# Patient Record
Sex: MALE | Race: WHITE | NOT HISPANIC OR LATINO | URBAN - METROPOLITAN AREA
[De-identification: names, ages, dates, MRNs, and addresses within clinical notes are randomized per-mention and may not be internally consistent; named-entity substitution may affect disease eponyms.]

---

## 2017-02-04 ENCOUNTER — INPATIENT (INPATIENT)
Facility: HOSPITAL | Age: 57
LOS: 2 days | Discharge: HOME | DRG: 602 | End: 2017-02-07
Attending: STUDENT IN AN ORGANIZED HEALTH CARE EDUCATION/TRAINING PROGRAM | Admitting: STUDENT IN AN ORGANIZED HEALTH CARE EDUCATION/TRAINING PROGRAM
Payer: COMMERCIAL

## 2017-02-04 VITALS
RESPIRATION RATE: 18 BRPM | HEART RATE: 118 BPM | DIASTOLIC BLOOD PRESSURE: 92 MMHG | TEMPERATURE: 98 F | WEIGHT: 283.96 LBS | HEIGHT: 72 IN | OXYGEN SATURATION: 91 % | SYSTOLIC BLOOD PRESSURE: 146 MMHG

## 2017-02-04 LAB
APPEARANCE UR: CLEAR — SIGNIFICANT CHANGE UP
APTT BLD: 30.1 SEC — SIGNIFICANT CHANGE UP (ref 27.5–37.4)
BASE EXCESS BLDCOV CALC-SCNC: 5.3 MMOL/L — HIGH (ref -9.3–0.3)
BASOPHILS NFR BLD AUTO: 0.4 % — SIGNIFICANT CHANGE UP (ref 0–2)
BILIRUB UR-MCNC: NEGATIVE — SIGNIFICANT CHANGE UP
CK MB BLD-MCNC: 3.23 % — SIGNIFICANT CHANGE UP
CK MB CFR SERPL CALC: 9.3 NG/ML — HIGH (ref 0.5–3.6)
CK SERPL-CCNC: 288 U/L — SIGNIFICANT CHANGE UP (ref 39–308)
COLOR SPEC: YELLOW — SIGNIFICANT CHANGE UP
DIFF PNL FLD: (no result)
EOSINOPHIL NFR BLD AUTO: 1.6 % — SIGNIFICANT CHANGE UP (ref 0–6)
GAS PNL BLDCOV: 7.34 — SIGNIFICANT CHANGE UP (ref 7.25–7.45)
GAS PNL BLDCOV: SIGNIFICANT CHANGE UP
GLUCOSE UR QL: NEGATIVE — SIGNIFICANT CHANGE UP
HCO3 BLDCOV-SCNC: 33.4 MMOL/L — SIGNIFICANT CHANGE UP
HCT VFR BLD CALC: 45.6 % — SIGNIFICANT CHANGE UP (ref 39–50)
HGB BLD-MCNC: 14.8 G/DL — SIGNIFICANT CHANGE UP (ref 13–17)
INR BLD: 1.15 — SIGNIFICANT CHANGE UP (ref 0.88–1.16)
KETONES UR-MCNC: NEGATIVE — SIGNIFICANT CHANGE UP
LACTATE SERPL-SCNC: 1.4 MMOL/L — SIGNIFICANT CHANGE UP (ref 0.5–2)
LEUKOCYTE ESTERASE UR-ACNC: NEGATIVE — SIGNIFICANT CHANGE UP
LYMPHOCYTES # BLD AUTO: 21.9 % — SIGNIFICANT CHANGE UP (ref 13–44)
MCHC RBC-ENTMCNC: 30.1 PG — SIGNIFICANT CHANGE UP (ref 27–34)
MCHC RBC-ENTMCNC: 32.5 G/DL — SIGNIFICANT CHANGE UP (ref 32–36)
MCV RBC AUTO: 92.9 FL — SIGNIFICANT CHANGE UP (ref 80–100)
MONOCYTES NFR BLD AUTO: 7.7 % — SIGNIFICANT CHANGE UP (ref 2–14)
NEUTROPHILS NFR BLD AUTO: 68.4 % — SIGNIFICANT CHANGE UP (ref 43–77)
NITRITE UR-MCNC: NEGATIVE — SIGNIFICANT CHANGE UP
PCO2 BLDCOV: 64 MMHG — HIGH (ref 27–49)
PH UR: 6 — SIGNIFICANT CHANGE UP (ref 4–8)
PLATELET # BLD AUTO: 230 K/UL — SIGNIFICANT CHANGE UP (ref 150–400)
PO2 BLDCOA: 50 MMHG — HIGH (ref 17–41)
PROT UR-MCNC: NEGATIVE MG/DL — SIGNIFICANT CHANGE UP
PROTHROM AB SERPL-ACNC: 12.8 SEC — SIGNIFICANT CHANGE UP (ref 10–13.1)
RBC # BLD: 4.91 M/UL — SIGNIFICANT CHANGE UP (ref 4.2–5.8)
RBC # FLD: 13.7 % — SIGNIFICANT CHANGE UP (ref 10.3–16.9)
SAO2 % BLDCOV: SIGNIFICANT CHANGE UP
SP GR SPEC: 1.01 — SIGNIFICANT CHANGE UP (ref 1–1.03)
TROPONIN I SERPL-MCNC: <0.015 NG/ML — SIGNIFICANT CHANGE UP (ref 0.01–0.04)
UROBILINOGEN FLD QL: 0.2 E.U./DL — SIGNIFICANT CHANGE UP
WBC # BLD: 6.9 K/UL — SIGNIFICANT CHANGE UP (ref 3.8–10.5)
WBC # FLD AUTO: 6.9 K/UL — SIGNIFICANT CHANGE UP (ref 3.8–10.5)

## 2017-02-04 PROCEDURE — 93010 ELECTROCARDIOGRAM REPORT: CPT

## 2017-02-04 PROCEDURE — 71020: CPT | Mod: 26

## 2017-02-04 PROCEDURE — 99285 EMERGENCY DEPT VISIT HI MDM: CPT | Mod: 25

## 2017-02-04 RX ORDER — RIVAROXABAN 15 MG-20MG
20 KIT ORAL ONCE
Qty: 0 | Refills: 0 | Status: COMPLETED | OUTPATIENT
Start: 2017-02-04 | End: 2017-02-04

## 2017-02-04 RX ORDER — METOPROLOL TARTRATE 50 MG
50 TABLET ORAL DAILY
Qty: 0 | Refills: 0 | Status: DISCONTINUED | OUTPATIENT
Start: 2017-02-04 | End: 2017-02-05

## 2017-02-04 RX ORDER — IPRATROPIUM/ALBUTEROL SULFATE 18-103MCG
3 AEROSOL WITH ADAPTER (GRAM) INHALATION ONCE
Qty: 0 | Refills: 0 | Status: COMPLETED | OUTPATIENT
Start: 2017-02-04 | End: 2017-02-04

## 2017-02-04 RX ORDER — VANCOMYCIN HCL 1 G
1000 VIAL (EA) INTRAVENOUS ONCE
Qty: 0 | Refills: 0 | Status: COMPLETED | OUTPATIENT
Start: 2017-02-04 | End: 2017-02-04

## 2017-02-04 RX ORDER — METOPROLOL TARTRATE 50 MG
100 TABLET ORAL ONCE
Qty: 0 | Refills: 0 | Status: COMPLETED | OUTPATIENT
Start: 2017-02-04 | End: 2017-02-04

## 2017-02-04 RX ORDER — FUROSEMIDE 40 MG
40 TABLET ORAL ONCE
Qty: 0 | Refills: 0 | Status: COMPLETED | OUTPATIENT
Start: 2017-02-04 | End: 2017-02-04

## 2017-02-04 RX ORDER — METOPROLOL TARTRATE 50 MG
100 TABLET ORAL DAILY
Qty: 0 | Refills: 0 | Status: DISCONTINUED | OUTPATIENT
Start: 2017-02-04 | End: 2017-02-04

## 2017-02-04 RX ADMIN — Medication 250 MILLIGRAM(S): at 22:57

## 2017-02-04 RX ADMIN — Medication 40 MILLIGRAM(S): at 21:30

## 2017-02-04 RX ADMIN — RIVAROXABAN 20 MILLIGRAM(S): KIT at 23:36

## 2017-02-04 RX ADMIN — Medication 100 MILLIGRAM(S): at 23:36

## 2017-02-04 RX ADMIN — Medication 3 MILLILITER(S): at 21:30

## 2017-02-04 RX ADMIN — Medication 50 MILLIGRAM(S): at 23:36

## 2017-02-04 RX ADMIN — Medication 3 MILLILITER(S): at 22:32

## 2017-02-04 NOTE — ED PROVIDER NOTE - PROGRESS NOTE DETAILS
Case discussed with , plan: put back on his original medications, continue monitor while in the ER , admit to medicine if needed  for cellulitis and /or social service placement.

## 2017-02-04 NOTE — ED PROVIDER NOTE - MEDICAL DECISION MAKING DETAILS
58 yo male with h/o COPD, Afib, HTN, chronic LE edema, CHF, in the ER with worsening of his leg edema and pain as well as SOB . pt is non-compliant with his medications. CXR,  labs done, pt started on nebulizer treatment and all his medications for  BP and rate control, broad spectrum abx given for labs cellulitis. will admit for further management.

## 2017-02-04 NOTE — ED ADULT NURSE NOTE - OBJECTIVE STATEMENT
58 y/o male w/ a fib (on xarelto, non compliant), COPD, 58 y/o male w/ a fib (on xarelto, non compliant), COPD, HTN, HLD c/o SOB and LE swelling x4 weeks. Patient tachypneic and tachycardiac in a fib on arrival. Patient states "[place]." Patient has wet cough w/ clear mucous production. +2 pitting edema to lower extremities. Patient A+Ox3, skin intact, patient able to ambulate steady with assistance.

## 2017-02-04 NOTE — ED PROVIDER NOTE - MUSCULOSKELETAL, MLM
Spine appears normal, range of motion is not limited, no muscle or joint tenderness, b/l LE pitting edema++,

## 2017-02-04 NOTE — ED PROVIDER NOTE - SKIN, MLM
chronic appearing, erythematous, edematous, with dry scaly skin lower extremities+, increased warmth to touch+,

## 2017-02-04 NOTE — ED PROVIDER NOTE - OBJECTIVE STATEMENT
56 yo male with h/o HTN, Afib, COPD, smoker, obese, homeless, appears  disheveled and  SOB. Pt states that he lost his medications? on the train few days ago? and his legs became more swollen and his breathing is much worse. Pt denies fever or chills, denies CP.

## 2017-02-05 DIAGNOSIS — R06.02 SHORTNESS OF BREATH: ICD-10-CM

## 2017-02-05 DIAGNOSIS — J44.9 CHRONIC OBSTRUCTIVE PULMONARY DISEASE, UNSPECIFIED: ICD-10-CM

## 2017-02-05 DIAGNOSIS — I82.409 ACUTE EMBOLISM AND THROMBOSIS OF UNSPECIFIED DEEP VEINS OF UNSPECIFIED LOWER EXTREMITY: ICD-10-CM

## 2017-02-05 DIAGNOSIS — L03.119 CELLULITIS OF UNSPECIFIED PART OF LIMB: ICD-10-CM

## 2017-02-05 DIAGNOSIS — I48.91 UNSPECIFIED ATRIAL FIBRILLATION: ICD-10-CM

## 2017-02-05 DIAGNOSIS — I50.9 HEART FAILURE, UNSPECIFIED: ICD-10-CM

## 2017-02-05 DIAGNOSIS — I10 ESSENTIAL (PRIMARY) HYPERTENSION: ICD-10-CM

## 2017-02-05 LAB
ANION GAP SERPL CALC-SCNC: 9 MMOL/L — SIGNIFICANT CHANGE UP (ref 9–16)
BUN SERPL-MCNC: 14 MG/DL — SIGNIFICANT CHANGE UP (ref 7–23)
CALCIUM SERPL-MCNC: 9.2 MG/DL — SIGNIFICANT CHANGE UP (ref 8.5–10.5)
CHLORIDE SERPL-SCNC: 100 MMOL/L — SIGNIFICANT CHANGE UP (ref 96–108)
CO2 SERPL-SCNC: 34 MMOL/L — HIGH (ref 22–31)
CREAT SERPL-MCNC: 0.93 MG/DL — SIGNIFICANT CHANGE UP (ref 0.5–1.3)
GLUCOSE SERPL-MCNC: 164 MG/DL — HIGH (ref 70–99)
HCT VFR BLD CALC: 49.7 % — SIGNIFICANT CHANGE UP (ref 39–50)
HGB BLD-MCNC: 16.3 G/DL — SIGNIFICANT CHANGE UP (ref 13–17)
MAGNESIUM SERPL-MCNC: 2.1 MG/DL — SIGNIFICANT CHANGE UP (ref 1.6–2.4)
MCHC RBC-ENTMCNC: 31.1 PG — SIGNIFICANT CHANGE UP (ref 27–34)
MCHC RBC-ENTMCNC: 32.8 G/DL — SIGNIFICANT CHANGE UP (ref 32–36)
MCV RBC AUTO: 94.8 FL — SIGNIFICANT CHANGE UP (ref 80–100)
PLATELET # BLD AUTO: 247 K/UL — SIGNIFICANT CHANGE UP (ref 150–400)
POTASSIUM SERPL-MCNC: 4.5 MMOL/L — SIGNIFICANT CHANGE UP (ref 3.5–5.3)
POTASSIUM SERPL-SCNC: 4.5 MMOL/L — SIGNIFICANT CHANGE UP (ref 3.5–5.3)
RBC # BLD: 5.24 M/UL — SIGNIFICANT CHANGE UP (ref 4.2–5.8)
RBC # FLD: 13.8 % — SIGNIFICANT CHANGE UP (ref 10.3–16.9)
SODIUM SERPL-SCNC: 143 MMOL/L — SIGNIFICANT CHANGE UP (ref 135–145)
WBC # BLD: 10 K/UL — SIGNIFICANT CHANGE UP (ref 3.8–10.5)
WBC # FLD AUTO: 10 K/UL — SIGNIFICANT CHANGE UP (ref 3.8–10.5)

## 2017-02-05 PROCEDURE — 93970 EXTREMITY STUDY: CPT | Mod: 26

## 2017-02-05 PROCEDURE — 93010 ELECTROCARDIOGRAM REPORT: CPT

## 2017-02-05 PROCEDURE — 99223 1ST HOSP IP/OBS HIGH 75: CPT

## 2017-02-05 RX ORDER — IPRATROPIUM/ALBUTEROL SULFATE 18-103MCG
3 AEROSOL WITH ADAPTER (GRAM) INHALATION EVERY 4 HOURS
Qty: 0 | Refills: 0 | Status: DISCONTINUED | OUTPATIENT
Start: 2017-02-05 | End: 2017-02-05

## 2017-02-05 RX ORDER — NICOTINE POLACRILEX 2 MG
1 GUM BUCCAL DAILY
Qty: 0 | Refills: 0 | Status: DISCONTINUED | OUTPATIENT
Start: 2017-02-05 | End: 2017-02-07

## 2017-02-05 RX ORDER — RIVAROXABAN 15 MG-20MG
20 KIT ORAL EVERY 24 HOURS
Qty: 0 | Refills: 0 | Status: DISCONTINUED | OUTPATIENT
Start: 2017-02-05 | End: 2017-02-07

## 2017-02-05 RX ORDER — DIGOXIN 250 MCG
0.12 TABLET ORAL DAILY
Qty: 0 | Refills: 0 | Status: DISCONTINUED | OUTPATIENT
Start: 2017-02-05 | End: 2017-02-07

## 2017-02-05 RX ORDER — VANCOMYCIN HCL 1 G
1500 VIAL (EA) INTRAVENOUS EVERY 12 HOURS
Qty: 0 | Refills: 0 | Status: DISCONTINUED | OUTPATIENT
Start: 2017-02-05 | End: 2017-02-06

## 2017-02-05 RX ORDER — METOPROLOL TARTRATE 50 MG
100 TABLET ORAL DAILY
Qty: 0 | Refills: 0 | Status: DISCONTINUED | OUTPATIENT
Start: 2017-02-05 | End: 2017-02-07

## 2017-02-05 RX ORDER — LEVALBUTEROL 1.25 MG/.5ML
0.63 SOLUTION, CONCENTRATE RESPIRATORY (INHALATION) EVERY 4 HOURS
Qty: 0 | Refills: 0 | Status: DISCONTINUED | OUTPATIENT
Start: 2017-02-05 | End: 2017-02-07

## 2017-02-05 RX ORDER — ACETAMINOPHEN 500 MG
650 TABLET ORAL EVERY 6 HOURS
Qty: 0 | Refills: 0 | Status: DISCONTINUED | OUTPATIENT
Start: 2017-02-05 | End: 2017-02-07

## 2017-02-05 RX ORDER — ATORVASTATIN CALCIUM 80 MG/1
40 TABLET, FILM COATED ORAL AT BEDTIME
Qty: 0 | Refills: 0 | Status: DISCONTINUED | OUTPATIENT
Start: 2017-02-05 | End: 2017-02-07

## 2017-02-05 RX ORDER — FLUTICASONE PROPIONATE AND SALMETEROL 50; 250 UG/1; UG/1
1 POWDER ORAL; RESPIRATORY (INHALATION)
Qty: 0 | Refills: 0 | Status: DISCONTINUED | OUTPATIENT
Start: 2017-02-05 | End: 2017-02-07

## 2017-02-05 RX ORDER — LEVALBUTEROL 1.25 MG/.5ML
0.31 SOLUTION, CONCENTRATE RESPIRATORY (INHALATION) EVERY 4 HOURS
Qty: 0 | Refills: 0 | Status: DISCONTINUED | OUTPATIENT
Start: 2017-02-05 | End: 2017-02-05

## 2017-02-05 RX ORDER — FUROSEMIDE 40 MG
40 TABLET ORAL
Qty: 0 | Refills: 0 | Status: DISCONTINUED | OUTPATIENT
Start: 2017-02-05 | End: 2017-02-07

## 2017-02-05 RX ADMIN — Medication 3 MILLILITER(S): at 02:08

## 2017-02-05 RX ADMIN — Medication 60 MILLIGRAM(S): at 21:36

## 2017-02-05 RX ADMIN — Medication 60 MILLIGRAM(S): at 14:00

## 2017-02-05 RX ADMIN — Medication 0.12 MILLIGRAM(S): at 10:42

## 2017-02-05 RX ADMIN — Medication 300 MILLIGRAM(S): at 21:36

## 2017-02-05 RX ADMIN — RIVAROXABAN 20 MILLIGRAM(S): KIT at 17:49

## 2017-02-05 RX ADMIN — FLUTICASONE PROPIONATE AND SALMETEROL 1 DOSE(S): 50; 250 POWDER ORAL; RESPIRATORY (INHALATION) at 21:36

## 2017-02-05 RX ADMIN — Medication 40 MILLIGRAM(S): at 10:42

## 2017-02-05 RX ADMIN — Medication 1 PATCH: at 12:38

## 2017-02-05 RX ADMIN — Medication 60 MILLIGRAM(S): at 06:24

## 2017-02-05 RX ADMIN — FLUTICASONE PROPIONATE AND SALMETEROL 1 DOSE(S): 50; 250 POWDER ORAL; RESPIRATORY (INHALATION) at 10:41

## 2017-02-05 RX ADMIN — Medication 300 MILLIGRAM(S): at 10:41

## 2017-02-05 RX ADMIN — Medication 100 MILLIGRAM(S): at 19:40

## 2017-02-05 RX ADMIN — ATORVASTATIN CALCIUM 40 MILLIGRAM(S): 80 TABLET, FILM COATED ORAL at 21:36

## 2017-02-05 RX ADMIN — Medication 40 MILLIGRAM(S): at 21:36

## 2017-02-05 NOTE — H&P ADULT. - PROBLEM SELECTOR PLAN 6
BP improved to SBP 140s from HTN in ED. Acceptable for now.  - c.w tx as above    PPx. Xarelto    FEN. No IVF for now. Replete lytes prn K>4, Mg>2. NPO on bipap    Dispo. Pending clincial improvement. BP improved to SBP 140s from HTN in ED. Acceptable for now.  - c/w tx as above    PPx. Xarelto    FEN. No IVF for now. Replete lytes prn K>4, Mg>2. NPO on bipap    Dispo. Pending clinical improvement.

## 2017-02-05 NOTE — H&P ADULT. - PROBLEM SELECTOR PLAN 3
Last EF: 50% 2/2016. Not floridly overloaded on exam.  - echo in AM  - c/w lasix 40 bid  - toprol XL 50 qd  - strict IOs Last EF: 50% 2/2016. Not floridly overloaded on exam. s/p lasix 40 iv x 1 in ED.  - echo in AM  - c/w lasix 40 bid  - toprol  qd  - strict IOs Last EF: 50% 2/2016. Not overloaded on exam. s/p lasix 40 iv x 1 in ED.  - echo in AM  - c/w lasix 40 bid  - toprol  qd  - strict IOs

## 2017-02-05 NOTE — H&P ADULT. - PROBLEM SELECTOR PLAN 4
No wheezing on exam, good air movement BL. Elevated bicarb chronic, likely 2/2 chronic CO2 retention. Current smoker. Last DC paperwork suggests should be on home O2 and bipap.  - no steroids for now, does not appear to be in exacerbation  - kianna standing overnight  - c/w fluticasone/salmeterol No wheezing on exam, good air movement BL. Elevated bicarb chronic, likely 2/2 chronic CO2 retention. Current smoker. Last DC paperwork suggests should be on home O2 and bipap.  - no steroids for now, does not appear to be in exacerbation  - kianna standing overnight  - c/w fluticasone/salmeterol bid No wheezing on exam, good air movement BL though decreased sounds at BL bases. Elevated bicarb chronic, likely 2/2 chronic CO2 retention. Current smoker. Last DC paperwork suggests should be on home O2 and bipap.  - will start solumedrol 60 q8h  - duonebs standing overnight  - c/w fluticasone/salmeterol bid

## 2017-02-05 NOTE — H&P ADULT. - HISTORY OF PRESENT ILLNESS
56 yo undomeciled per report M PMH current smoker, COPD (last [facility] DC 2/2016 on home O2 and bipap), HTN, HLD, Afib (on xeralto), dCHF (EF: 50% 2/2016), 2/2016 H DC after dCHF exacerbation and rapid AFib presents with SOB. Pt poor historian and minimal participation in exam; reports lost all his medications about 1 week ago and subsequently began experiencing BL leg swelling and SOB.    In ED: 97.8, 101-120, 146/92 - 160/100, 18-22, 95% bipap. UA: no infection. CXR: clear lungs, no overt focal opacification. Received: xarelto 20, vanc 1g IV, diltiazem 20, lasix 40, toprol , duonebs. Cardio service rejected pt in ED, admit to medicine. 56 yo M PMH current smoker (50 pack years), COPD (last [facility] DC 2/2016 on home O2 and bipap), HTN, HLD, Afib (on xeralto), dCHF (EF: 50% 2/2016), 2/2016 [facility] DC after dCHF exacerbation and rapid AFib presents with BL LE swelling and SOB. Pt poor historian. Reports lost all his medications about 1 week ago and subsequently began experiencing BL leg swelling and SOB. Also experiencing L shin pain x 2 weeks. No recent trauma to area. Denies new HA/chest pain/abd pain/N/V/diarrhea/dysuria/fever/chills. Lives in [facility] in a shared living situation. Reports no PMD or recent MD visits. +unchanged chronic cough, occasional yellow sputum usually dry cough.    In ED: 97.8, 101-120, 146/92 - 160/100, 18-22, 95% bipap. UA: no infection. CXR: clear lungs, no overt focal opacification. Received: xarelto 20, vanc 1g IV, diltiazem 20, lasix 40, toprol , duonebs. Cardio service rejected pt in ED, admit to medicine. 56 yo M PMH current smoker (50 pack years), COPD (last [facility] DC 2/2016 on home O2 and bipap), HTN, HLD, Afib (on xeralto), dCHF (EF: 50% 2/2016), 2/2016 [facility] DC after dCHF exacerbation and rapid AFib presents with BL LE swelling and SOB. Pt poor historian. Reports lost all his medications about 1 week ago and subsequently began experiencing BL leg swelling and SOB. Also experiencing L shin pain x 2 weeks. No recent trauma to area. Denies new HA/chest pain/abd pain/N/V/diarrhea/dysuria/fever/chills. Lives in [facility] in a shared living situation. Reports no PMD or recent MD visits. +unchanged chronic cough, occasional yellow sputum usually dry cough.    In ED: 97.8, 101-120, 146/92 - 160/100, 18-22, 95% bipap. ECG: AF, no acute STT changes. Trops wnl. UA: no infection. CXR: clear lungs, no overt focal opacification. Received: xarelto 20, vanc 1g IV, diltiazem 20, lasix 40, toprol , duonebs. Cardio service rejected pt in ED, admit to medicine. 58 yo M PMH current smoker (50 pack years), COPD (last [facility] DC 2/2016 on home O2 and bipap), HTN, HLD, Afib (on xeralto), dCHF (EF: 50% 2/2016), 2/2016 [facility] DC after dCHF exacerbation and rapid AFib presents with BL LE swelling, SOB, and L>R leg pain. Pt poor historian. Reports lost all his medications about 1 week ago and subsequently began experiencing BL leg swelling and SOB. Also experiencing L shin area pain x 2 weeks. No recent trauma. Denies new HA/chest pain/abd pain/N/V/diarrhea/dysuria/fever/chills. Lives in [facility] in a shared living situation. Reports no PMD or recent MD visits. +unchanged chronic cough, occasional yellow sputum though usually dry cough.    In ED: 97.8, 101-120, 146/92 - 160/100, 18-22, 95% on bipap. ECG: AF, no acute STT changes. Trops wnl. UA: no infection. CXR: clear lungs, no overt focal opacification. Received: xarelto 20, vanc 1g IV, diltiazem 20, lasix 40 iv x1, toprol , duonebs. Cardio service rejected pt in ED, admit to medicine. 58 yo M PMH current smoker (50 pack years), COPD (last [facility] DC 2/2016 on home O2 and bipap), HTN, HLD, Afib (on xeralto), CHF (EF: 50% 2/2016), 2/2016 [facility] DC after CHF exacerbation and rapid AFib presents with BL LE swelling, SOB, and L>R leg pain. Pt poor historian. Reports lost all his medications about 1 week ago and subsequently began experiencing BL leg swelling and SOB. Also experiencing L shin area pain x 2 weeks. No recent trauma. Denies new HA/chest pain/abd pain/N/V/diarrhea/dysuria/fever/chills. Lives in [facility] in a shared living situation. Reports no PMD or recent MD visits. +unchanged chronic cough, occasional yellow sputum though usually dry cough.    In ED: 97.8, 101-120, 146/92 - 160/100, 18-22, 95% on bipap. ECG: AF, no acute STT changes. Trops wnl. UA: no infection. CXR: clear lungs, no overt focal opacification. Received: xarelto 20, vanc 1g IV, diltiazem 20, lasix 40 iv x1, toprol , duonebs. Cardio service rejected pt in ED, admit to medicine.

## 2017-02-05 NOTE — H&P ADULT. - SUBSTANCE USE COMMENT, PROFILE
current smoker, many years current smoker, >40 pack years, drinks etoh socially, not heavily, no past withdrawal, no illicit drug use

## 2017-02-05 NOTE — H&P ADULT. - ASSESSMENT
56 yo undomeciled per report M PMH current smoker, COPD (last [facility] DC 2/2016 on home O2 and bipap), HTN, HLD, Afib (on xeralto), dCHF (EF: 50% 2/2016), 2/2016 [facility] DC after dCHF exacerbation and rapid AFib presents with leg swelling and SOB x 1 week. Pt poor historian and minimal participation in exam; reports lost all his medications about 1 week ago and subsequently began experiencing BL leg swelling and SOB. Does not meet SIRS sepsis criteria. HTN and rapid Afib improved after tx in ED, currently on bipap. CXR: clear lungs, no overt focal opacification. Cardio service rejected pt in ED, admit to medicine for evaluation of cellulitis. 58 yo undomeciled per report M PMH current smoker, COPD (last [facility] DC 2/2016 on home O2 and bipap), HTN, HLD, Afib (on xeralto), CHF (EF: 50% 2/2016), 2/2016 [facility] DC after CHF exacerbation and rapid AFib presents with leg swelling and SOB x 1 week. Pt poor historian and minimal participation in exam; reports lost all his medications about 1 week ago and subsequently began experiencing BL leg swelling and SOB. Does not meet SIRS sepsis criteria. HTN and rapid Afib improved after tx in ED, currently on bipap. CXR: clear lungs, no overt focal opacification. Cardio service rejected pt in ED, admit to medicine for evaluation of cellulitis.

## 2017-02-05 NOTE — H&P ADULT. - PROBLEM SELECTOR PLAN 1
Likely 2/2 rapid AF, obesity, needs home O2/bipap per last DC paperwork but unlikely using it. Not floridly overloaded on exam or CXR, BNP 900s, low suspicion CHF exacerbation. No wheezing, good air movement BL - low suspicion COPD exacerbation. Not meetings sepsis criteria 2/2 cellulitis - sepsis unlikely driving SOB, non toxic on exam.  - c/w bipap overnight  - rate control Afib as below Likely 2/2 rapid AF, obesity, needs home O2/bipap per last DC paperwork but unlikely using it. Not floridly overloaded on exam or CXR, BNP 900s, low suspicion CHF exacerbation. No wheezing, good air movement BL though decreased sounds at bases - suspicion for COPD exacerbation that may be contributing to SOB though not extremely tight. Not meetings sepsis criteria 2/2 cellulitis - sepsis unlikely driving SOB, non toxic on exam.  - c/w bipap overnight  - rate control Afib as below

## 2017-02-05 NOTE — H&P ADULT. - PROBLEM SELECTOR PLAN 2
Not meeting sepsis criteria. LLE cellulitis, no streaking or evidence lymphangitic spread. Non toxic on exam. Unclear if pt homeless or  lives in shared facility but will presume MRSA risk factors now.  - c/w vanc 1500 bid for now  - will hold fluids for now given propensity for overload Not meeting sepsis criteria. LLE cellulitis, no streaking or evidence lymphangitic spread. Non toxic on exam. Unclear if pt homeless or  lives in shared facility but will presume MRSA risk factors now. s/p vanc 1g in ED.  - c/w vanc 1500 mg bid for now  - will hold fluids for now given propensity for overload

## 2017-02-05 NOTE — H&P ADULT. - RS GEN PE MLT RESP DETAILS PC
respirations non-labored/breath sounds equal/no wheezes/good air movement/clear to auscultation bilaterally/no rhonchi/BL bases decreased breath sounds/no rales

## 2017-02-05 NOTE — H&P ADULT. - PROBLEM SELECTOR PLAN 5
Now rate improved to low 100s after tx in ED. Missed about 1 week of home xarelto 20 qd after lost meds. Clinical picture concerning for PE though pt improving on current therapy. WELLS score 1.5, probability low.  - c/w rate control toprol XL 50 qd  - xarelto 20 qd  - pt on digoxin in past, level low now in setting of missed Rx  - fu Dr. [place] cardiologist in AM for collateral and true Rx dosing  - lower extr dopplers ordered given swelling/erythema/off xarelto for at least 1 week per report  - consider cardio cx in AM Now rate improved to low 100s after tx in ED. Missed about 1 week of home xarelto 20 qd after lost meds. Clinical picture concerning for PE though pt improving on current therapy. WELLS score 1.5, probability low.  - c/w rate control toprol  qd  - xarelto 20 qd  - pt on digoxin in past, level low now in setting of missed Rx  - fu Dr. [place] cardiologist in AM for collateral and true Rx dosing. Pt also on spironolactone per sunrise, holding now, obtain collateral  - lower extr dopplers ordered given swelling/erythema/off xarelto for at least 1 week per report  - consider cardio cx in AM if pt becomes hemodynamically unstable Now rate improved to low 100s after tx in ED. Missed about 1 week of home xarelto 20 qd after lost meds. Clinical picture concerning for PE though pt improving on current therapy. WELLS score 1.5, probability low.  - c/w rate control toprol  qd  - xarelto 20 qd  - c/w digoxin 0.125 qd  - fu Dr. [place] cardiologist in AM for collateral and true Rx dosing  - lower extr dopplers ordered given swelling/erythema/off xarelto for at least 1 week per report  - consider cardio cx in AM if pt becomes hemodynamically unstable

## 2017-02-05 NOTE — PROGRESS NOTE ADULT - SUBJECTIVE AND OBJECTIVE BOX
patient seen and examined this am.     has significant LLE cellulitis with underlying CVI, non toxic, no pain no crepitus will need to continue vanc, monitor. LE elevation.   has OHS BMI 38.5, COPD AF etc. no pna CXR. treating for COPD flare  continue Fio 50% 10/5 RR 24 MV 9.5 intermittent bipap settings as this is helping    Continue IV steroids as well  Needs  sleep study ideally  Check TTE  keep AF rate controlled  check duplex le for completeness   need SW to assist with dispo  reviewed with patient patient seen and examined this am.     has significant LLE cellulitis with underlying CVI, non toxic, no pain no crepitus will need to continue vanc, monitor. LE elevation.   has OHS BMI 38.5, COPD AF etc. no pna CXR. treating for COPD flare  continue Fio 50% 10/5 RR 24 MV 9.5 intermittent bipap settings as this is helping    Continue IV steroids as well  nebs atc/prn  Needs  sleep study ideally  Check TTE  keep AF rate controlled  check duplex le for completeness   need SW to assist with dispo  reviewed with patient

## 2017-02-05 NOTE — H&P ADULT. - SKIN COMMENTS
as described above. Some excoriations on shins, no oozing, crusted. Well demarcated shin erythema as above

## 2017-02-05 NOTE — H&P ADULT. - EXTREMITIES COMMENTS
+1-2 BL pedal edema, trace edema up to BL shins. BL shin area erythema/warmth/tenderness, worst on L side, does not disappear with leg raise test.  toenails disfigured/thick/green/yellow coloration. grey brown patches on elbows

## 2017-02-05 NOTE — H&P ADULT. - ATTENDING COMMENTS
57M hx COPD (current smoker, per old records, supposed to be on home O2 and Bipap, but does not use), HTN, HLD, Afib (on Xarelto), HFpEF (although does not seem to be present on echo 2/2016, EF 50% at that time), likely undomiciled who presents tonight with complaints of b/l LE edema, L shin pain, and SOB. Chief complaint is about his legs and only complains about breathing when prompted. Per pt, he "lost all of his meds" about a week ago (rx writer in [place] shows no rx fills) and since then has been having symptoms as above. In ED, pt was hypoxic (91%RA), tachypneic, and in rapid A fib (up to 120), hypertensive (160/104 highest) all of which improved after bipap, Lasix, Cardizem, Metoprolol. Pt admitted for LLE cellulitis and COPD exacerbation, but no steroids given in ED. Cardio called (Dr. Ahumada) from ED and rejected pt for cardiac admission.  Vitals as described above in ED, currently -110, RR 20-22 on BIPAP, BP 140s/90s  Exam sig for diffuse b/l expiratory low-pitch wheezing, irregularly irregular tachycardic rhythm without murmurs, but heart sounds distant, very obese abdomen, LE and skin findings as described in PGY-2 note  Labs and imaging reviewed  A/P: 1) LLE cellulitis--Continue Vanco for now while awaiting cultures and monitoring for improvement. Can likely deescalate when improvement noted. Check LE dopplers.  2) Dyspnea--Likely multifactorial as pt with wheezing on exam. Probable component of COPD exacerbation. He also has an extremely obese abdomen which limits diaphragmatic excursion, contributing to his (seemingly chronic) respiratory acidosis (HCO3 on metabolic panel is 33, which is what it was throughout his last admission). He also likely has UTE and is seemingly noncompliant with BIPAP, so likely component of Pulmonary HTN as well. Given LE edema and likely noncompliance with Xarelto in setting of hypoxia and tachycardia, cannot r/o DVT/PE as well. Will check LE dopplers, especially given cellulitis. Unlikely CHF exacerbation (EF 50%, no e/o diastolic dysfunction on echo 1ya) as CXR without mendy pulmonary edema, BNP only marginally elevated, no crackles on exam. Also unlikely pneumonia as CXR without clear infiltrate and no exam or history findings consistent with this. At this point, will start Solumedrol, continue with nebulizers (Xopenex preferred over albuterol given tachycardia). Will check echocardiogram to reevaluate cardiac function and start home Lasix dose (which he has not been taking). Continue with bipap for now and attempt to wean in AM after steroids take full effect.  3) COPD--Plan as above.  4) A fib--Rapid rate likely 2/2 not taking home meds. Pt is on Digoxin and Toprol XL per outpt med rec. Rate more controlled now after Toprol and Cardizem in ED. Will restart home medications and monitor HR closely. Cardio called in ED (rejected pt for cardiac admission). Would reach out in AM for further guidance in management. Restart Xarelto.

## 2017-02-06 DIAGNOSIS — G47.33 OBSTRUCTIVE SLEEP APNEA (ADULT) (PEDIATRIC): ICD-10-CM

## 2017-02-06 DIAGNOSIS — Z29.9 ENCOUNTER FOR PROPHYLACTIC MEASURES, UNSPECIFIED: ICD-10-CM

## 2017-02-06 DIAGNOSIS — R73.9 HYPERGLYCEMIA, UNSPECIFIED: ICD-10-CM

## 2017-02-06 DIAGNOSIS — I50.23 ACUTE ON CHRONIC SYSTOLIC (CONGESTIVE) HEART FAILURE: ICD-10-CM

## 2017-02-06 LAB
ANION GAP SERPL CALC-SCNC: 7 MMOL/L — LOW (ref 9–16)
BUN SERPL-MCNC: 26 MG/DL — HIGH (ref 7–23)
CALCIUM SERPL-MCNC: 8.7 MG/DL — SIGNIFICANT CHANGE UP (ref 8.5–10.5)
CHLORIDE SERPL-SCNC: 98 MMOL/L — SIGNIFICANT CHANGE UP (ref 96–108)
CO2 SERPL-SCNC: 34 MMOL/L — HIGH (ref 22–31)
CREAT SERPL-MCNC: 0.77 MG/DL — SIGNIFICANT CHANGE UP (ref 0.5–1.3)
GLUCOSE SERPL-MCNC: 344 MG/DL — HIGH (ref 70–99)
HCT VFR BLD CALC: 45.6 % — SIGNIFICANT CHANGE UP (ref 39–50)
HGB BLD-MCNC: 14.5 G/DL — SIGNIFICANT CHANGE UP (ref 13–17)
MAGNESIUM SERPL-MCNC: 2.1 MG/DL — SIGNIFICANT CHANGE UP (ref 1.6–2.4)
MCHC RBC-ENTMCNC: 30.3 PG — SIGNIFICANT CHANGE UP (ref 27–34)
MCHC RBC-ENTMCNC: 31.8 G/DL — LOW (ref 32–36)
MCV RBC AUTO: 95.2 FL — SIGNIFICANT CHANGE UP (ref 80–100)
PLATELET # BLD AUTO: 237 K/UL — SIGNIFICANT CHANGE UP (ref 150–400)
POTASSIUM SERPL-MCNC: 4.4 MMOL/L — SIGNIFICANT CHANGE UP (ref 3.5–5.3)
POTASSIUM SERPL-SCNC: 4.4 MMOL/L — SIGNIFICANT CHANGE UP (ref 3.5–5.3)
RBC # BLD: 4.79 M/UL — SIGNIFICANT CHANGE UP (ref 4.2–5.8)
RBC # FLD: 13.4 % — SIGNIFICANT CHANGE UP (ref 10.3–16.9)
SODIUM SERPL-SCNC: 139 MMOL/L — SIGNIFICANT CHANGE UP (ref 135–145)
WBC # BLD: 14.4 K/UL — HIGH (ref 3.8–10.5)
WBC # FLD AUTO: 14.4 K/UL — HIGH (ref 3.8–10.5)

## 2017-02-06 PROCEDURE — 99233 SBSQ HOSP IP/OBS HIGH 50: CPT

## 2017-02-06 PROCEDURE — 93306 TTE W/DOPPLER COMPLETE: CPT | Mod: 26

## 2017-02-06 PROCEDURE — 99223 1ST HOSP IP/OBS HIGH 75: CPT | Mod: GC

## 2017-02-06 RX ORDER — RIVAROXABAN 15 MG-20MG
1 KIT ORAL
Qty: 30 | Refills: 1 | OUTPATIENT
Start: 2017-02-06 | End: 2017-04-06

## 2017-02-06 RX ORDER — DIGOXIN 250 MCG
1 TABLET ORAL
Qty: 30 | Refills: 1 | OUTPATIENT
Start: 2017-02-06 | End: 2017-04-06

## 2017-02-06 RX ORDER — METOPROLOL TARTRATE 50 MG
1 TABLET ORAL
Qty: 60 | Refills: 1 | OUTPATIENT
Start: 2017-02-06 | End: 2017-04-06

## 2017-02-06 RX ORDER — INSULIN LISPRO 100/ML
VIAL (ML) SUBCUTANEOUS
Qty: 0 | Refills: 0 | Status: DISCONTINUED | OUTPATIENT
Start: 2017-02-06 | End: 2017-02-07

## 2017-02-06 RX ORDER — ATORVASTATIN CALCIUM 80 MG/1
1 TABLET, FILM COATED ORAL
Qty: 30 | Refills: 0 | OUTPATIENT
Start: 2017-02-06 | End: 2017-03-08

## 2017-02-06 RX ORDER — FLUTICASONE PROPIONATE AND SALMETEROL 50; 250 UG/1; UG/1
1 POWDER ORAL; RESPIRATORY (INHALATION)
Qty: 1 | Refills: 0 | OUTPATIENT
Start: 2017-02-06 | End: 2017-03-08

## 2017-02-06 RX ORDER — FUROSEMIDE 40 MG
1 TABLET ORAL
Qty: 60 | Refills: 1 | OUTPATIENT
Start: 2017-02-06 | End: 2017-04-06

## 2017-02-06 RX ORDER — SPIRONOLACTONE 25 MG/1
1 TABLET, FILM COATED ORAL
Qty: 30 | Refills: 0 | OUTPATIENT
Start: 2017-02-06 | End: 2017-03-08

## 2017-02-06 RX ORDER — PANTOPRAZOLE SODIUM 20 MG/1
40 TABLET, DELAYED RELEASE ORAL
Qty: 0 | Refills: 0 | Status: DISCONTINUED | OUTPATIENT
Start: 2017-02-06 | End: 2017-02-07

## 2017-02-06 RX ORDER — TIOTROPIUM BROMIDE 18 UG/1
1 CAPSULE ORAL; RESPIRATORY (INHALATION)
Qty: 1 | Refills: 0 | OUTPATIENT
Start: 2017-02-06 | End: 2017-03-08

## 2017-02-06 RX ADMIN — Medication 10: at 22:00

## 2017-02-06 RX ADMIN — Medication 1 PATCH: at 12:08

## 2017-02-06 RX ADMIN — Medication 60 MILLIGRAM(S): at 06:18

## 2017-02-06 RX ADMIN — Medication 40 MILLIGRAM(S): at 09:54

## 2017-02-06 RX ADMIN — Medication 40 MILLIGRAM(S): at 14:22

## 2017-02-06 RX ADMIN — ATORVASTATIN CALCIUM 40 MILLIGRAM(S): 80 TABLET, FILM COATED ORAL at 22:00

## 2017-02-06 RX ADMIN — FLUTICASONE PROPIONATE AND SALMETEROL 1 DOSE(S): 50; 250 POWDER ORAL; RESPIRATORY (INHALATION) at 21:59

## 2017-02-06 RX ADMIN — Medication 1 TABLET(S): at 22:00

## 2017-02-06 RX ADMIN — Medication 0.12 MILLIGRAM(S): at 06:18

## 2017-02-06 RX ADMIN — Medication 300 MILLIGRAM(S): at 09:54

## 2017-02-06 RX ADMIN — Medication 100 MILLIGRAM(S): at 19:54

## 2017-02-06 RX ADMIN — RIVAROXABAN 20 MILLIGRAM(S): KIT at 17:49

## 2017-02-06 RX ADMIN — Medication 40 MILLIGRAM(S): at 17:49

## 2017-02-06 RX ADMIN — FLUTICASONE PROPIONATE AND SALMETEROL 1 DOSE(S): 50; 250 POWDER ORAL; RESPIRATORY (INHALATION) at 10:07

## 2017-02-06 NOTE — DISCHARGE NOTE ADULT - SECONDARY DIAGNOSIS.
COPD (chronic obstructive pulmonary disease) Essential hypertension Afib Cellulitis of leg UTE (obstructive sleep apnea)

## 2017-02-06 NOTE — CONSULT NOTE ADULT - PROBLEM SELECTOR RECOMMENDATION 2
I discussed the condition in details with the patient,  He has a short thick neck, with Mallampati IV and symptoms are consistent with UTE.  His quality of sleep improved since he was started on Bipap at the hospital.  He will need sleep study.  The ECHO did not reveal any evidence of UTE.  The venous CO2 is elevated and his baseline oxyge saturation is 93% on supplemental oxygen.  ABG on RA to evaluate the patient for NIV at home.  Also follow saturation on RA and with exertion.  Sleep study as outpatient

## 2017-02-06 NOTE — DISCHARGE NOTE ADULT - MEDICATION SUMMARY - MEDICATIONS TO TAKE
I will START or STAY ON the medications listed below when I get home from the hospital:    predniSONE 20 mg oral tablet  -- 2 tab(s) by mouth once a day  -- Indication: For COPD (chronic obstructive pulmonary disease)    spironolactone 25 mg oral tablet  -- 1 tab(s) by mouth once a day  -- Indication: For CHF (congestive heart failure)    digoxin 125 mcg (0.125 mg) oral tablet  -- 1 tab(s) by mouth once a day  -- Indication: For Afib    rivaroxaban 20 mg oral tablet  -- 1 tab(s) by mouth every 24 hours  -- Indication: For Afib    atorvastatin 40 mg oral tablet  -- 1 tab(s) by mouth once a day (at bedtime)  -- Indication: For HLD    metoprolol succinate 100 mg oral tablet, extended release  -- 1 tab(s) by mouth every 12 hours  -- Indication: For HTN    Spiriva 18 mcg inhalation capsule  -- 1 cap(s) inhaled once a day  -- Indication: For COPD (chronic obstructive pulmonary disease)    fluticasone-salmeterol 250 mcg-50 mcg inhalation powder  -- 1 puff(s) inhaled 2 times a day  -- Indication: For COPD (chronic obstructive pulmonary disease)    furosemide 40 mg oral tablet  -- 1 tab(s) by mouth 2 times a day  -- Indication: For CHF (congestive heart failure)    amoxicillin-clavulanate 875 mg-125 mg oral tablet  -- 1 tab(s) by mouth every 12 hours  -- Indication: For Cellulitis of leg

## 2017-02-06 NOTE — DISCHARGE NOTE ADULT - CARE PLAN
Principal Discharge DX:	CHF (congestive heart failure)  Secondary Diagnosis:	COPD (chronic obstructive pulmonary disease)  Secondary Diagnosis:	Essential hypertension  Secondary Diagnosis:	Afib Principal Discharge DX:	CHF (congestive heart failure)  Goal:	Continue medications as directed.  Instructions for follow-up, activity and diet:	Continue spironolactone 25mg PO daily and Lasix 40mg Oral two times a day.  Secondary Diagnosis:	COPD (chronic obstructive pulmonary disease)  Instructions for follow-up, activity and diet:	Spiriva 18 once a day and fluticasone-salmeterol 250 mcg 1 puff inhaled 2 times a day  You were started on steroids for a COPD exacerbation. Continue prednisone 40mg daily for an additional 3 days.  Secondary Diagnosis:	Essential hypertension  Secondary Diagnosis:	Afib  Instructions for follow-up, activity and diet:	Continue your medications: Digoxin 0.125 mg oral daily, Rivaroxaban 20mg oral daily, Metoprolol 100mg XL 2x a day Principal Discharge DX:	CHF (congestive heart failure)  Goal:	Continue medications as directed.  Instructions for follow-up, activity and diet:	Continue spironolactone 25mg PO daily and Lasix 40mg Oral two times a day. Missing your home medications will worsen your shortness of breath and leg swelling.   Follow up with [facility] with the information provided. They will be contacting you directly to make an appointment. As you requested, your friend Teresa [phone] was contacted and notified about your outpatient follow up needs.  Secondary Diagnosis:	COPD (chronic obstructive pulmonary disease)  Instructions for follow-up, activity and diet:	Spiriva 18 once a day and fluticasone-salmeterol 250 mcg 1 puff inhaled 2 times a day  You were started on steroids for a COPD exacerbation. Continue prednisone 40mg daily for an additional 3 days.  You were provided with home oxygen on a previous admission. The social workers at [facility] have worked on providing you with a BiPAP for use at night. As discussed, compliance with your BiPAP and medications is important for prevention of multiple hospitalizations. Also as discussed, it is very dangerous to smoke and you must abstain from smoking, especially when requiring the use of supplemental Oxygen.  Secondary Diagnosis:	Afib  Instructions for follow-up, activity and diet:	Continue your medications: Digoxin 0.125 mg oral daily, Rivaroxaban 20mg oral daily, Metoprolol 100mg XL 2x a day  Secondary Diagnosis:	Cellulitis of leg  Instructions for follow-up, activity and diet:	You had a skin infection in your leg. Keep your legs elevated at night. Continue three more days of antibiotics: Augmentin two times a day. Principal Discharge DX:	CHF (congestive heart failure)  Goal:	Continue medications as directed.  Instructions for follow-up, activity and diet:	Continue spironolactone 25mg PO daily and Lasix 40mg Oral two times a day. Missing your home medications will worsen your shortness of breath and leg swelling.   Follow up with [facility] with the information provided. They will be contacting you directly to make an appointment. As you requested, your friend Teresa [phone] was contacted and notified about your outpatient follow up needs.  Secondary Diagnosis:	COPD (chronic obstructive pulmonary disease)  Instructions for follow-up, activity and diet:	Spiriva 18 once a day and fluticasone-salmeterol 250 mcg 1 puff inhaled 2 times a day  You were started on steroids for a COPD exacerbation. Continue prednisone 40mg daily for an additional 3 days.  You were provided with home oxygen on a previous admission. The social workers at [facility] have worked on providing you with a BiPAP for use at night. As discussed, compliance with your BiPAP and medications is important for prevention of multiple hospitalizations. Also as discussed, it is very dangerous to smoke and you must abstain from smoking, especially when requiring the use of supplemental Oxygen.  Secondary Diagnosis:	Afib  Instructions for follow-up, activity and diet:	Continue your medications: Digoxin 0.125 mg oral daily, Rivaroxaban 20mg oral daily, Metoprolol 100mg XL 2x a day  Secondary Diagnosis:	Cellulitis of leg  Instructions for follow-up, activity and diet:	You had a skin infection in your leg. Keep your legs elevated at night. Continue three more days of antibiotics: Augmentin two times a day.  Secondary Diagnosis:	UTE (obstructive sleep apnea)  Instructions for follow-up, activity and diet:	You have very low oxygen levels while you sleep, likely because you have obstructive sleep apnea. You were evaluated by a pulmonologist while in the hospital. Follow up with Dr. Cedillo for a sleep study to confirm and work up your UTE   - You have been set up for a BiPAP to be sent to your home with the information provided to you. You will be called by a respiratory therapist. Principal Discharge DX:	CHF (congestive heart failure)  Goal:	Continue medications as directed.  Instructions for follow-up, activity and diet:	Continue spironolactone 25mg PO daily and Lasix 40mg Oral two times a day. Missing your home medications will worsen your shortness of breath and leg swelling.   Follow up with [facility] with the information provided. They will be contacting you directly to make an appointment. As you requested, your friend Teresa [phone] was contacted and notified about your outpatient follow up needs.  Secondary Diagnosis:	COPD (chronic obstructive pulmonary disease)  Instructions for follow-up, activity and diet:	Spiriva 18 once a day and fluticasone-salmeterol 250 mcg 1 puff inhaled 2 times a day  You were started on steroids for a COPD exacerbation. Continue prednisone 40mg daily for an additional 3 days.  You were provided with home oxygen on a previous admission. The social workers at [facility] have worked on providing you with a BiPAP for use at night. As discussed, compliance with your BiPAP and medications is important for prevention of multiple hospitalizations. Also as discussed, it is very dangerous to smoke and you must abstain from smoking, especially when requiring the use of supplemental Oxygen.  Secondary Diagnosis:	Afib  Instructions for follow-up, activity and diet:	Continue your medications: Digoxin 0.125 mg oral daily, Rivaroxaban 20mg oral daily, Metoprolol 100mg XL 2x a day  Secondary Diagnosis:	Cellulitis of leg  Instructions for follow-up, activity and diet:	You had a skin infection in your leg. Keep your legs elevated at night. Continue three more days of antibiotics: Augmentin two times a day.  Secondary Diagnosis:	UTE (obstructive sleep apnea)  Instructions for follow-up, activity and diet:	You have very low oxygen levels while you sleep, likely because you have obstructive sleep apnea. You were evaluated by a pulmonologist while in the hospital. Follow up with Dr. Cedillo for a sleep study to confirm and work up your UTE   - You have been set up for a BiPAP to be sent to your home with the information provided to you. You will be called by a respiratory therapist. Principal Discharge DX:	Acute on chronic diastolic (congestive) heart failure  Goal:	Continue medications as directed.  Instructions for follow-up, activity and diet:	Continue spironolactone 25mg PO daily and Lasix 40mg Oral two times a day. Missing your home medications will worsen your shortness of breath and leg swelling.   Follow up with [facility] with the information provided. They will be contacting you directly to make an appointment. As you requested, your friend Teresa [phone] was contacted and notified about your outpatient follow up needs.  Secondary Diagnosis:	COPD (chronic obstructive pulmonary disease)  Instructions for follow-up, activity and diet:	Spiriva 18 once a day and fluticasone-salmeterol 250 mcg 1 puff inhaled 2 times a day  You were started on steroids for a COPD exacerbation. Continue prednisone 40mg daily for an additional 3 days.  You were provided with home oxygen on a previous admission. The social workers at [facility] have worked on providing you with a BiPAP for use at night. As discussed, compliance with your BiPAP and medications is important for prevention of multiple hospitalizations. Also as discussed, it is very dangerous to smoke and you must abstain from smoking, especially when requiring the use of supplemental Oxygen.  Secondary Diagnosis:	Afib  Instructions for follow-up, activity and diet:	Continue your medications: Digoxin 0.125 mg oral daily, Rivaroxaban 20mg oral daily, Metoprolol 100mg XL 2x a day  Secondary Diagnosis:	Cellulitis of leg  Instructions for follow-up, activity and diet:	You had a skin infection in your leg. Keep your legs elevated at night. Continue three more days of antibiotics: Augmentin two times a day.  Secondary Diagnosis:	UTE (obstructive sleep apnea)  Instructions for follow-up, activity and diet:	You have very low oxygen levels while you sleep, likely because you have obstructive sleep apnea. You were evaluated by a pulmonologist while in the hospital. Follow up with Dr. Cedillo for a sleep study to confirm and work up your UTE   - You have been set up for a BiPAP to be sent to your home with the information provided to you. You will be called by a respiratory therapist. Principal Discharge DX:	Acute on chronic diastolic (congestive) heart failure  Goal:	Continue medications as directed.  Instructions for follow-up, activity and diet:	Continue spironolactone 25mg PO daily and Lasix 40mg Oral two times a day. Missing your home medications will worsen your shortness of breath and leg swelling.   Follow up with [facility] with the information provided. They will be contacting you directly to make an appointment. As you requested, your friend Teresa [phone] was contacted and notified about your outpatient follow up needs.  Secondary Diagnosis:	COPD (chronic obstructive pulmonary disease)  Instructions for follow-up, activity and diet:	Spiriva 18 once a day and fluticasone-salmeterol 250 mcg 1 puff inhaled 2 times a day  You were started on steroids for a COPD exacerbation. Continue prednisone 40mg daily for an additional 3 days.  You were provided with home oxygen on a previous admission. The social workers at [facility] have worked on providing you with a BiPAP for use at night. As discussed, compliance with your BiPAP and medications is important for prevention of multiple hospitalizations. Also as discussed, it is very dangerous to smoke and you must abstain from smoking, especially when requiring the use of supplemental Oxygen.  Secondary Diagnosis:	Afib  Instructions for follow-up, activity and diet:	Continue your medications: Digoxin 0.125 mg oral daily, Rivaroxaban 20mg oral daily, Metoprolol 100mg XL 2x a day  Secondary Diagnosis:	Cellulitis of leg  Instructions for follow-up, activity and diet:	You had a skin infection in your leg. Keep your legs elevated at night. Continue three more days of antibiotics: Augmentin two times a day.  Secondary Diagnosis:	UTE (obstructive sleep apnea)  Instructions for follow-up, activity and diet:	You have very low oxygen levels while you sleep, likely because you have obstructive sleep apnea. You were evaluated by a pulmonologist while in the hospital. Follow up with Dr. Cedillo for a sleep study to confirm and work up your UTE   - You have been set up for a BiPAP to be sent to your home with the information provided to you. You will be called by a respiratory therapist.

## 2017-02-06 NOTE — PROGRESS NOTE ADULT - SUBJECTIVE AND OBJECTIVE BOX
Pt seen and examined by me at bedside.   Pt states lost his medication approx 1 week ago, states breathing better and able to ambulate to the bathroom. As per collateral information, BIPAP/home O2 was not delivered last discharge 2/2 wrong address.  VS: AF///RR 18/94%RA  comfortable, speaking full sentences  +irregular irregular  decrease BS on bases  +edema of lower extremities; +chronic venous changs  + onychomycosis    labs reviewed

## 2017-02-06 NOTE — DISCHARGE NOTE ADULT - CARE PROVIDER_API CALL
[facility],   [address] [address]  Phone: [phone]  Fax: (       - [facility],   [address] [address]  Phone: [phone]  Fax: (   )    -    Marina Cedillo), Critical Care Medicine; Pulmonary Disease  [address] [address]  Phone: [phone]  Fax: [phone]

## 2017-02-06 NOTE — PROGRESS NOTE ADULT - PROBLEM SELECTOR PLAN 3
likely 2/2 CVI; change IV vanco to augmentin
So sigs of sirs. LLE cellulitis likely 2/2 chronic venous stasis and b/l edema. low MRSA suspicion  - Augmentin for total 5 days

## 2017-02-06 NOTE — DISCHARGE NOTE ADULT - PROVIDER TOKENS
FREE:[LAST:[[facility]],PHONE:[phone]],FAX:[(   )    -],ADDRESS:[[address] [place] [address]]] FREE:[LAST:[[facility]],PHONE:[phone]],FAX:[(   )    -],ADDRESS:[[address] [place] [address]]],TOKEN:'[id number]'

## 2017-02-06 NOTE — PROGRESS NOTE ADULT - ASSESSMENT
56 yo undomeciled per report M PMH current smoker, COPD (last [facility] DC 2/2016 on home O2 and bipap), HTN, HLD, Afib (on xeralto), CHF (EF: 50% 2/2016), 2/2016 [facility] DC after CHF exacerbation and rapid AFib presents with leg swelling and SOB x 1 week 2/2 copd and chf exacerbation 2/2 med noncompliance.

## 2017-02-06 NOTE — PROGRESS NOTE ADULT - SUBJECTIVE AND OBJECTIVE BOX
INTERVAL HPI/OVERNIGHT EVENTS: No events. Patient reports improvement of his symptoms. Is ambulating without SOB.    VITAL SIGNS:  T(F): 97.7  HR: 119  BP: 146/93  RR: 20  SpO2: 96%  Wt(kg): --    PHYSICAL EXAM:    Constitutional: Awake Alert NAD on NC. Poorly groomed, obese  Eyes: PERRL, R exotropia   ENMT: MMM  Neck: JVD not appreciated limited 2/2 habitus  Respiratory: Good air entry b/l. No wheezing   Cardiovascular: Irregular rhythm   Gastrointestinal: Obese, soft NTND  Extremities: WWP. B/l LE pitting up ankle +erythema + chronic venous stasis changes      MEDICATIONS  (STANDING):  nicotine -   7 mG/24Hr(s) Patch 1patch Transdermal daily  rivaroxaban 20milliGRAM(s) Oral every 24 hours  atorvastatin 40milliGRAM(s) Oral at bedtime  fluticasone / salmeterol 250-50 MICROgram(s) Diskus 1Dose(s) Inhalation two times a day  furosemide    Tablet 40milliGRAM(s) Oral two times a day  metoprolol succinate ER 100milliGRAM(s) Oral daily  digoxin     Tablet 0.125milliGRAM(s) Oral daily  predniSONE   Tablet 40milliGRAM(s) Oral daily  amoxicillin  875 milliGRAM(s)/clavulanate 1Tablet(s) Oral every 12 hours  insulin lispro (HumaLOG) corrective regimen sliding scale  SubCutaneous Before meals and at bedtime  pantoprazole    Tablet 40milliGRAM(s) Oral before breakfast    MEDICATIONS  (PRN):  acetaminophen   Tablet 650milliGRAM(s) Oral every 6 hours PRN For Temp greater than 38 C (100.4 F)  acetaminophen   Tablet. 650milliGRAM(s) Oral every 6 hours PRN Mild Pain (1 - 3)  levalbuterol Inhalation 0.63milliGRAM(s) Inhalation every 4 hours PRN Wheezing/Shortness of Breath      Allergies    No Known Allergies    Intolerances        LABS:                        14.5   14.4  )-----------( 237      ( 2017 10:52 )             45.6     2017 11:05    139    |  98     |  26     ----------------------------<  344    4.4     |  34     |  0.77     Ca    8.7        2017 11:05  Mg     2.1       2017 11:05    TPro  7.1    /  Alb  3.4    /  TBili  0.3    /  DBili  x      /  AST  36     /  ALT  34     /  AlkPhos  54     2017 21:19    PT/INR - ( 2017 21:19 )   PT: 12.8 sec;   INR: 1.15          PTT - ( 2017 21:19 )  PTT:30.1 sec  Urinalysis Basic - ( 2017 21:57 )    Color: Yellow / Appearance: Clear / S.010 / pH: x  Gluc: x / Ketone: NEGATIVE  / Bili: NEGATIVE / Urobili: 0.2 E.U./dL   Blood: x / Protein: NEGATIVE mg/dL / Nitrite: NEGATIVE   Leuk Esterase: NEGATIVE / RBC: < 5 /HPF / WBC < 5 /HPF   Sq Epi: x / Non Sq Epi: Rare /HPF / Bacteria: Present /HPF

## 2017-02-06 NOTE — DISCHARGE NOTE ADULT - PLAN OF CARE
Continue spironolactone 25mg PO daily and Lasix 40mg Oral two times a day. Continue your medications: Digoxin 0.125 mg oral daily, Rivaroxaban 20mg oral daily, Metoprolol 100mg XL 2x a day Spiriva 18 once a day and fluticasone-salmeterol 250 mcg 1 puff inhaled 2 times a day  You were started on steroids for a COPD exacerbation. Continue prednisone 40mg daily for an additional 3 days. Continue medications as directed. Continue spironolactone 25mg PO daily and Lasix 40mg Oral two times a day. Missing your home medications will worsen your shortness of breath and leg swelling.   Follow up with [facility] with the information provided. They will be contacting you directly to make an appointment. As you requested, your friend Teresa [phone] was contacted and notified about your outpatient follow up needs. You had a skin infection in your leg. Keep your legs elevated at night. Continue three more days of antibiotics: Augmentin two times a day. Spiriva 18 once a day and fluticasone-salmeterol 250 mcg 1 puff inhaled 2 times a day  You were started on steroids for a COPD exacerbation. Continue prednisone 40mg daily for an additional 3 days.  You were provided with home oxygen on a previous admission. The social workers at [facility] have worked on providing you with a BiPAP for use at night. As discussed, compliance with your BiPAP and medications is important for prevention of multiple hospitalizations. Also as discussed, it is very dangerous to smoke and you must abstain from smoking, especially when requiring the use of supplemental Oxygen. You have very low oxygen levels while you sleep, likely because you have obstructive sleep apnea. You were evaluated by a pulmonologist while in the hospital. Follow up with Dr. Cedillo for a sleep study to confirm and work up your UTE   - You have been set up for a BiPAP to be sent to your home with the information provided to you. You will be called by a respiratory therapist.

## 2017-02-06 NOTE — DISCHARGE NOTE ADULT - PATIENT PORTAL LINK FT
“You can access the [facility] Patient Portal, offered by [facility], by registering with the following website: [email or web address]”

## 2017-02-06 NOTE — PROGRESS NOTE ADULT - PROBLEM SELECTOR PLAN 6
check hgba1c, noted AM FS >126 occasionally,  adjust insulin accordingly.
Therapeutic anticoagulation for afib. PPI, ISS w/ steroids

## 2017-02-06 NOTE — PROGRESS NOTE ADULT - PROBLEM SELECTOR PLAN 4
c/w Xarelto; c/w Digoxin and Toprol XL
Rate improved w/ start of home meds.  - c/w rate control toprol  qd  - xarelto 20 qd  - c/w digoxin 0.125 qd

## 2017-02-06 NOTE — DISCHARGE NOTE ADULT - HOSPITAL COURSE
58 yo M current smoker (50 pack years) PMH of COPD (last [facility] DC 2/2016 on home O2 and bipap), HTN, HLD, Afib (on xeralto), CHF (EF: 50% 2/2016), 2/2016 [facility] DC after CHF exacerbation and rapid AFib presents with BL LE swelling, SOB, and L>R leg pain. Patient reports losing his medications 1 week ago and subsequently began experiencing BL leg swelling and SOB. Also experienced L shin area pain x 2 weeks. No recent trauma. Reported no feve4r,s no PMD or recent MD visits. In ED: 97.8, 101-120, 146/92 - 160/100, 18-22, 95% on bipap. ECG: AF, no acute STT changes. Trops wnl. UA: no infection. CXR: clear lungs, no overt focal opacification. Received: xarelto 20, vanc 1g IV for concerns of cellulitis, diltiazem 20, lasix 40 iv x1, toprol , duonebs. Cardio service rejected pt in ED, admitted to medicine. Patient was on bipap overnight. Started on IV steroids and home meds. Bipap was deescalated to NC and patient reported improvement of his symptoms. LE dopplers performed showing no DVT. Patient was treated for acute SOB 2.2 medication noncompliance with improvement of his symptoms to baseline. Patient was discharged with new prescriptions. 58 yo M current smoker (50 pack years) PMH of COPD (last [facility] DC 2/2016 on home O2 and bipap), HTN, HLD, Afib (on xeralto), CHF (EF: 50% 2/2016), 2/2016 [facility] DC after CHF exacerbation and rapid AFib presents with BL LE swelling, SOB, and L>R leg pain. Patient reports losing his medications 1 week ago and subsequently began experiencing BL leg swelling and SOB. Also experienced L shin area pain x 2 weeks. No recent trauma. Reported no feve4r,s no PMD or recent MD visits. In ED: 97.8, 101-120, 146/92 - 160/100, 18-22, 95% on bipap. ECG: AF, no acute STT changes. Trops wnl. UA: no infection. CXR: clear lungs, no overt focal opacification. Received: xarelto 20, vanc 1g IV for concerns of cellulitis (deescalated to augmentin,) diltiazem 20, lasix 40 iv x1, toprol , duonebs. Cardio service rejected pt in ED, admitted to medicine. Patient was on bipap overnight. Started on IV steroids and home meds. Bipap was deescalated to NC and patient reported improvement of his symptoms. LE dopplers performed showing no DVT. Patient was treated for acute SOB 2.2 medication noncompliance with improvement of his symptoms to baseline. Patient was discharged with new prescriptions. 56 yo M current smoker (50 pack years) PMH of COPD (last [facility] DC 2/2016 on home O2 and bipap), HTN, HLD, Afib (on xeralto), CHF (EF: 50% 2/2016), 2/2016 [facility] DC after CHF exacerbation and rapid AFib presents with BL LE swelling, SOB, and L>R leg pain. Patient reports losing his medications 1 week ago and subsequently began experiencing BL leg swelling and SOB. Also experienced L shin area pain x 2 weeks. No recent trauma. Reported no feve4r,s no PMD or recent MD visits. In ED: 97.8, 101-120, 146/92 - 160/100, 18-22, 95% on bipap. ECG: AF, no acute STT changes. Trops wnl. UA: no infection. CXR: clear lungs, no overt focal opacification. Received: xarelto 20, vanc 1g IV for concerns of cellulitis (deescalated to augmentin,) diltiazem 20, lasix 40 iv x1, toprol , duonebs. Cardio service rejected pt in ED, admitted to medicine. Patient was on bipap overnight. Started on IV steroids and home meds. Bipap was deescalated to NC and patient reported improvement of his symptoms. LE dopplers performed showing no DVT. Patient was treated for acute SOB 2.2 medication noncompliance with improvement of his symptoms to baseline. Patient was discharged with new prescriptions and SW set up for a home BiPAP at night.

## 2017-02-06 NOTE — CONSULT NOTE ADULT - RESPIRATORY AND THORAX COMMENTS
dry mouth in morning., fatigued, snoring, lack of energy.  He falls asleep very easily especially when sanitary

## 2017-02-06 NOTE — CONSULT NOTE ADULT - SUBJECTIVE AND OBJECTIVE BOX
Patient is a 57y old  Male who presents with a chief complaint of COPD/Cellulitis (06 Feb 2017 13:34)      HPI:  56 yo M PMH current smoker (50 pack years), COPD (last [facility] DC 2/2016 on home O2 and bipap), HTN, HLD, Afib (on xeralto), CHF (EF: 50% 2/2016), 2/2016 [facility] DC after CHF exacerbation and rapid AFib presents with BL LE swelling, SOB, and L>R leg pain. Pt poor historian. Reports lost all his medications about 1 week ago and subsequently began experiencing BL leg swelling and SOB. Also experiencing L shin area pain x 2 weeks. No recent trauma. Denies new HA/chest pain/abd pain/N/V/diarrhea/dysuria/fever/chills. Lives in [facility] in a shared living situation. Reports no PMD or recent MD visits. +unchanged chronic cough, occasional yellow sputum though usually dry cough.    In ED: 97.8, 101-120, 146/92 - 160/100, 18-22, 95% on bipap. ECG: AF, no acute STT changes. Trops wnl. UA: no infection. CXR: clear lungs, no overt focal opacification. Received: xarelto 20, vanc 1g IV, diltiazem 20, lasix 40 iv x1, toprol , duonebs. Cardio service rejected pt in ED, admit to medicine. (05 Feb 2017 01:16)  he is snoring, fatigued all the time, and easily doze off and fell from the chair    PAST MEDICAL & SURGICAL HISTORY:  Afib  CHF (congestive heart failure)  COPD (chronic obstructive pulmonary disease)  Essential hypertension: Hypertension  Hyperlipidemia: HLD (hyperlipidemia)  No significant past surgical history  UTE    FAMILY HISTORY:  No pertinent family history in first degree relatives      SOCIAL HISTORY:  Smoking Status: [ ] Current, [ ] Former, [ ] Never  Pack Years:    MEDICATIONS:  Pulmonary:  fluticasone / salmeterol 250-50 MICROgram(s) Diskus 1Dose(s) Inhalation two times a day  levalbuterol Inhalation 0.63milliGRAM(s) Inhalation every 4 hours PRN    Antimicrobials:  amoxicillin  875 milliGRAM(s)/clavulanate 1Tablet(s) Oral every 12 hours    Anticoagulants:  rivaroxaban 20milliGRAM(s) Oral every 24 hours    Onc:    GI/:  pantoprazole    Tablet 40milliGRAM(s) Oral before breakfast    Endocrine:  atorvastatin 40milliGRAM(s) Oral at bedtime  predniSONE   Tablet 40milliGRAM(s) Oral daily  insulin lispro (HumaLOG) corrective regimen sliding scale  SubCutaneous Before meals and at bedtime    Cardiac:  furosemide    Tablet 40milliGRAM(s) Oral two times a day  metoprolol succinate ER 100milliGRAM(s) Oral daily  digoxin     Tablet 0.125milliGRAM(s) Oral daily    Other Medications:  acetaminophen   Tablet 650milliGRAM(s) Oral every 6 hours PRN  acetaminophen   Tablet. 650milliGRAM(s) Oral every 6 hours PRN  nicotine -   7 mG/24Hr(s) Patch 1patch Transdermal daily      Allergies    No Known Allergies    Intolerances        Vital Signs Last 24 Hrs  T(C): 36.6, Max: 36.6 (02-06 @ 20:50)  T(F): 97.8, Max: 97.8 (02-06 @ 20:50)  HR: 120 (75 - 120)  BP: 164/89 (130/93 - 164/89)  BP(mean): --  RR: 20 (18 - 20)  SpO2: 93% (93% - 116%)    I & Os for current day (as of 02-06 @ 22:17)  =============================================  IN: 500 ml / OUT: 0 ml / NET: 500 ml        LABS:      CBC Full  -  ( 06 Feb 2017 10:52 )  WBC Count : 14.4 K/uL  Hemoglobin : 14.5 g/dL  Hematocrit : 45.6 %  Platelet Count - Automated : 237 K/uL  Mean Cell Volume : 95.2 fL  Mean Cell Hemoglobin : 30.3 pg  Mean Cell Hemoglobin Concentration : 31.8 g/dL  Auto Neutrophil # : x  Auto Lymphocyte # : x  Auto Monocyte # : x  Auto Eosinophil # : x  Auto Basophil # : x  Auto Neutrophil % : x  Auto Lymphocyte % : x  Auto Monocyte % : x  Auto Eosinophil % : x  Auto Basophil % : x    06 Feb 2017 11:05    139    |  98     |  26     ----------------------------<  344    4.4     |  34     |  0.77     Ca    8.7        06 Feb 2017 11:05  Mg     2.1       06 Feb 2017 11:05          EXAM:  XR CHEST PA - LAT                           PROCEDURE DATE:  02/04/2017                 INTERPRETATION:  Clinical History: Shortness of breath    Frontal and lateral examination the chest demonstrates the heart to be   within normal limits in transverse diameter. No acute infiltrates. Old   left rib fracture. No significant change noted in comparison to prior   examination of the chest 2/10/2016.    Impression: No acute infiltrates          XAM:  ECHOCARDIOGRAM (CARDIOL)       PROCEDURE DATE:  02/06/2017                    INTERPRETATION:  Patient Height: 182.0 cm  Patient Weight: 129.0 kg  Systolic Pressure: 142 mmHg  Diastolic Pressure: 79 mmHg  BSA: 2.5 m^2  Interpretation Summary  A complete two-dimensional transthoracic echocardiogram was performed (2D,   M-mode, spectral and color flow doppler). Study Quality: Poor.  Normal   left   ventricular size and wall thickness.Cannot rule out basal inferior wall   hypokinesis. The other walls probably conatract normally  The left   ventricular   ejection fraction is normal. The left ventricular ejection fraction is   55%.   The left atrial size is normal. Right atrial size is normal.Probably   normal   right ventricular size andfunction.No evidence for any hemodynamically   significant valvular disease.There was insufficient TR detected from   which to   calculate pulmonary artery systolic pressure.  The inferior vena cava is   normal in size (<2.1 cm) with normal inspiratory collapse (>50%)   consistent   with normal right atrial pressure.  There is no pericardial effusion.      RADIOLOGY & ADDITIONAL STUDIES (The following images were personally reviewed):

## 2017-02-07 VITALS
TEMPERATURE: 98 F | RESPIRATION RATE: 18 BRPM | DIASTOLIC BLOOD PRESSURE: 103 MMHG | SYSTOLIC BLOOD PRESSURE: 164 MMHG | OXYGEN SATURATION: 93 % | HEART RATE: 105 BPM

## 2017-02-07 LAB — HBA1C BLD-MCNC: 6.8 % — HIGH (ref 4.8–5.6)

## 2017-02-07 PROCEDURE — 82550 ASSAY OF CK (CPK): CPT

## 2017-02-07 PROCEDURE — 36415 COLL VENOUS BLD VENIPUNCTURE: CPT

## 2017-02-07 PROCEDURE — 83036 HEMOGLOBIN GLYCOSYLATED A1C: CPT

## 2017-02-07 PROCEDURE — 93970 EXTREMITY STUDY: CPT

## 2017-02-07 PROCEDURE — 82553 CREATINE MB FRACTION: CPT

## 2017-02-07 PROCEDURE — 71046 X-RAY EXAM CHEST 2 VIEWS: CPT

## 2017-02-07 PROCEDURE — 96374 THER/PROPH/DIAG INJ IV PUSH: CPT

## 2017-02-07 PROCEDURE — 81001 URINALYSIS AUTO W/SCOPE: CPT

## 2017-02-07 PROCEDURE — 99239 HOSP IP/OBS DSCHRG MGMT >30: CPT

## 2017-02-07 PROCEDURE — 99232 SBSQ HOSP IP/OBS MODERATE 35: CPT | Mod: GC

## 2017-02-07 PROCEDURE — 94660 CPAP INITIATION&MGMT: CPT

## 2017-02-07 PROCEDURE — 96375 TX/PRO/DX INJ NEW DRUG ADDON: CPT

## 2017-02-07 PROCEDURE — 85730 THROMBOPLASTIN TIME PARTIAL: CPT

## 2017-02-07 PROCEDURE — 81003 URINALYSIS AUTO W/O SCOPE: CPT

## 2017-02-07 PROCEDURE — 94640 AIRWAY INHALATION TREATMENT: CPT

## 2017-02-07 PROCEDURE — 93005 ELECTROCARDIOGRAM TRACING: CPT

## 2017-02-07 PROCEDURE — 85027 COMPLETE CBC AUTOMATED: CPT

## 2017-02-07 PROCEDURE — 85025 COMPLETE CBC W/AUTO DIFF WBC: CPT

## 2017-02-07 PROCEDURE — 80053 COMPREHEN METABOLIC PANEL: CPT

## 2017-02-07 PROCEDURE — 83880 ASSAY OF NATRIURETIC PEPTIDE: CPT

## 2017-02-07 PROCEDURE — 83605 ASSAY OF LACTIC ACID: CPT

## 2017-02-07 PROCEDURE — 87040 BLOOD CULTURE FOR BACTERIA: CPT

## 2017-02-07 PROCEDURE — 85610 PROTHROMBIN TIME: CPT

## 2017-02-07 PROCEDURE — 80162 ASSAY OF DIGOXIN TOTAL: CPT

## 2017-02-07 PROCEDURE — 83735 ASSAY OF MAGNESIUM: CPT

## 2017-02-07 PROCEDURE — 99285 EMERGENCY DEPT VISIT HI MDM: CPT | Mod: 25

## 2017-02-07 PROCEDURE — 80048 BASIC METABOLIC PNL TOTAL CA: CPT

## 2017-02-07 PROCEDURE — 82803 BLOOD GASES ANY COMBINATION: CPT

## 2017-02-07 PROCEDURE — 93306 TTE W/DOPPLER COMPLETE: CPT

## 2017-02-07 PROCEDURE — 84484 ASSAY OF TROPONIN QUANT: CPT

## 2017-02-07 RX ORDER — METOPROLOL TARTRATE 50 MG
100 TABLET ORAL
Qty: 0 | Refills: 0 | Status: DISCONTINUED | OUTPATIENT
Start: 2017-02-07 | End: 2017-02-07

## 2017-02-07 RX ADMIN — Medication 0.12 MILLIGRAM(S): at 06:42

## 2017-02-07 RX ADMIN — Medication 2: at 12:32

## 2017-02-07 RX ADMIN — Medication 1 TABLET(S): at 10:48

## 2017-02-07 RX ADMIN — PANTOPRAZOLE SODIUM 40 MILLIGRAM(S): 20 TABLET, DELAYED RELEASE ORAL at 06:43

## 2017-02-07 RX ADMIN — Medication 1 PATCH: at 12:32

## 2017-02-07 RX ADMIN — Medication 40 MILLIGRAM(S): at 06:42

## 2017-02-07 RX ADMIN — Medication 40 MILLIGRAM(S): at 06:43

## 2017-02-07 RX ADMIN — Medication 100 MILLIGRAM(S): at 17:29

## 2017-02-07 RX ADMIN — Medication 1 PATCH: at 12:33

## 2017-02-07 RX ADMIN — RIVAROXABAN 20 MILLIGRAM(S): KIT at 18:17

## 2017-02-07 RX ADMIN — Medication 40 MILLIGRAM(S): at 17:29

## 2017-02-07 RX ADMIN — FLUTICASONE PROPIONATE AND SALMETEROL 1 DOSE(S): 50; 250 POWDER ORAL; RESPIRATORY (INHALATION) at 10:48

## 2017-02-07 NOTE — PROGRESS NOTE ADULT - SUBJECTIVE AND OBJECTIVE BOX
Interval Events:  Patient seen and examined at bedside.    Patient is a 57y old  Male who presents with a chief complaint of COPD/Cellulitis (06 Feb 2017 13:34)    he sleeping better with the mask  PAST MEDICAL & SURGICAL HISTORY:  Afib  CHF (congestive heart failure)  COPD (chronic obstructive pulmonary disease)  Essential hypertension: Hypertension  Hyperlipidemia: HLD (hyperlipidemia)  No significant past surgical history      MEDICATIONS:  Pulmonary:  fluticasone / salmeterol 250-50 MICROgram(s) Diskus 1Dose(s) Inhalation two times a day  levalbuterol Inhalation 0.63milliGRAM(s) Inhalation every 4 hours PRN    Antimicrobials:  amoxicillin  875 milliGRAM(s)/clavulanate 1Tablet(s) Oral every 12 hours    Anticoagulants:  rivaroxaban 20milliGRAM(s) Oral every 24 hours    Cardiac:  furosemide    Tablet 40milliGRAM(s) Oral two times a day  digoxin     Tablet 0.125milliGRAM(s) Oral daily  metoprolol succinate ER 100milliGRAM(s) Oral two times a day      Allergies    No Known Allergies    Intolerances        Vital Signs Last 24 Hrs  T(C): 36.9, Max: 36.9 (02-07 @ 16:43)  T(F): 98.5, Max: 98.5 (02-07 @ 16:43)  HR: 105 (58 - 109)  BP: 164/103 (127/76 - 174/101)  BP(mean): --  RR: 18 (18 - 20)  SpO2: 93% (93% - 99%)        LABS:      CBC Full  -  ( 06 Feb 2017 10:52 )  WBC Count : 14.4 K/uL  Hemoglobin : 14.5 g/dL  Hematocrit : 45.6 %  Platelet Count - Automated : 237 K/uL  Mean Cell Volume : 95.2 fL  Mean Cell Hemoglobin : 30.3 pg  Mean Cell Hemoglobin Concentration : 31.8 g/dL  Auto Neutrophil # : x  Auto Lymphocyte # : x  Auto Monocyte # : x  Auto Eosinophil # : x  Auto Basophil # : x  Auto Neutrophil % : x  Auto Lymphocyte % : x  Auto Monocyte % : x  Auto Eosinophil % : x  Auto Basophil % : x    06 Feb 2017 11:05    139    |  98     |  26     ----------------------------<  344    4.4     |  34     |  0.77     Ca    8.7        06 Feb 2017 11:05  Mg     2.1       06 Feb 2017 11:05                          RADIOLOGY & ADDITIONAL STUDIES (The following images were personally reviewed):  Pablo:                                   No  Urine output:                  yes  DVT prophylaxis:         Yes         Flattus:                          Yes          Bowel movement:       Yes

## 2017-02-07 NOTE — PROGRESS NOTE ADULT - PROBLEM SELECTOR PROBLEM 2
COPD (chronic obstructive pulmonary disease)
COPD (chronic obstructive pulmonary disease)
UTE (obstructive sleep apnea)

## 2017-02-07 NOTE — PROGRESS NOTE ADULT - PROBLEM SELECTOR PLAN 2
smoking cessation; nicotine patch; c/w home meds  Pulm (Dr. Cedillo) updated on pt's admission; will try to setup BIPAP machine for home  change IV steroid to po prednisone for short course, long suspicion for COPD exacerbation more 2/2 noncompliance
Elevated bicarb chronic, likely 2/2 chronic CO2 retention. Current smoker. Last DC paperwork suggests should be on home O2 and bipap. s/p solumedrol 60 q8h  - duonebs standing overnight  - c/w fluticasone/salmeterol bid  - Prednisone 40 PO for total 5 days  - will dw social work for BiPAP set up  - Bipap overnight
follow on the sleep study as outpatient.  I discussed the case with the PMD an he will be supplied with bipap at home.  He might need higher IPAP

## 2017-02-07 NOTE — PROGRESS NOTE ADULT - PROBLEM SELECTOR PROBLEM 1
Acute on chronic systolic congestive heart failure
CHF (congestive heart failure)
COPD (chronic obstructive pulmonary disease)

## 2017-02-07 NOTE — PROGRESS NOTE ADULT - PROBLEM SELECTOR PLAN 1
2/2 noncompliance; c/w lasix 40mg po bid, monitor u/o; qdaily weight
Last EF: 50% 2/2016. likely w/ diastolic and R heart disfunction  - f/u echo  - c/w lasix 40 bid (home med)
he is stable and to continue on the bronchodilator.  PFT as outpatietn

## 2017-02-10 DIAGNOSIS — J44.1 CHRONIC OBSTRUCTIVE PULMONARY DISEASE WITH (ACUTE) EXACERBATION: ICD-10-CM

## 2017-02-10 DIAGNOSIS — I87.2 VENOUS INSUFFICIENCY (CHRONIC) (PERIPHERAL): ICD-10-CM

## 2017-02-10 DIAGNOSIS — I10 ESSENTIAL (PRIMARY) HYPERTENSION: ICD-10-CM

## 2017-02-10 DIAGNOSIS — G47.33 OBSTRUCTIVE SLEEP APNEA (ADULT) (PEDIATRIC): ICD-10-CM

## 2017-02-10 DIAGNOSIS — I48.91 UNSPECIFIED ATRIAL FIBRILLATION: ICD-10-CM

## 2017-02-10 DIAGNOSIS — E66.9 OBESITY, UNSPECIFIED: ICD-10-CM

## 2017-02-10 DIAGNOSIS — F17.210 NICOTINE DEPENDENCE, CIGARETTES, UNCOMPLICATED: ICD-10-CM

## 2017-02-10 DIAGNOSIS — E78.5 HYPERLIPIDEMIA, UNSPECIFIED: ICD-10-CM

## 2017-02-10 DIAGNOSIS — R73.9 HYPERGLYCEMIA, UNSPECIFIED: ICD-10-CM

## 2017-02-10 DIAGNOSIS — Z79.01 LONG TERM (CURRENT) USE OF ANTICOAGULANTS: ICD-10-CM

## 2017-02-10 DIAGNOSIS — L03.116 CELLULITIS OF LEFT LOWER LIMB: ICD-10-CM

## 2017-02-10 DIAGNOSIS — Z91.14 PATIENT'S OTHER NONCOMPLIANCE WITH MEDICATION REGIMEN: ICD-10-CM

## 2017-02-10 DIAGNOSIS — I50.33 ACUTE ON CHRONIC DIASTOLIC (CONGESTIVE) HEART FAILURE: ICD-10-CM

## 2017-02-10 LAB
CULTURE RESULTS: SIGNIFICANT CHANGE UP
CULTURE RESULTS: SIGNIFICANT CHANGE UP
SPECIMEN SOURCE: SIGNIFICANT CHANGE UP
SPECIMEN SOURCE: SIGNIFICANT CHANGE UP

## 2018-07-11 ENCOUNTER — INPATIENT (INPATIENT)
Facility: HOSPITAL | Age: 58
LOS: 2 days | Discharge: HOME | DRG: 189 | End: 2018-07-14
Attending: HOSPITALIST | Admitting: HOSPITALIST
Payer: COMMERCIAL

## 2018-07-11 VITALS
OXYGEN SATURATION: 90 % | DIASTOLIC BLOOD PRESSURE: 105 MMHG | SYSTOLIC BLOOD PRESSURE: 179 MMHG | HEART RATE: 96 BPM | TEMPERATURE: 98 F | RESPIRATION RATE: 17 BRPM

## 2018-07-11 DIAGNOSIS — I10 ESSENTIAL (PRIMARY) HYPERTENSION: ICD-10-CM

## 2018-07-11 DIAGNOSIS — J44.9 CHRONIC OBSTRUCTIVE PULMONARY DISEASE, UNSPECIFIED: ICD-10-CM

## 2018-07-11 DIAGNOSIS — L97.909 NON-PRESSURE CHRONIC ULCER OF UNSPECIFIED PART OF UNSPECIFIED LOWER LEG WITH UNSPECIFIED SEVERITY: ICD-10-CM

## 2018-07-11 DIAGNOSIS — Z29.9 ENCOUNTER FOR PROPHYLACTIC MEASURES, UNSPECIFIED: ICD-10-CM

## 2018-07-11 DIAGNOSIS — R63.8 OTHER SYMPTOMS AND SIGNS CONCERNING FOOD AND FLUID INTAKE: ICD-10-CM

## 2018-07-11 DIAGNOSIS — Z91.89 OTHER SPECIFIED PERSONAL RISK FACTORS, NOT ELSEWHERE CLASSIFIED: ICD-10-CM

## 2018-07-11 DIAGNOSIS — I50.9 HEART FAILURE, UNSPECIFIED: ICD-10-CM

## 2018-07-11 DIAGNOSIS — I48.91 UNSPECIFIED ATRIAL FIBRILLATION: ICD-10-CM

## 2018-07-11 DIAGNOSIS — E78.5 HYPERLIPIDEMIA, UNSPECIFIED: ICD-10-CM

## 2018-07-11 LAB
ALBUMIN SERPL ELPH-MCNC: 4 G/DL — SIGNIFICANT CHANGE UP (ref 3.3–5)
ALP SERPL-CCNC: 41 U/L — SIGNIFICANT CHANGE UP (ref 40–120)
ALT FLD-CCNC: 25 U/L — SIGNIFICANT CHANGE UP (ref 10–45)
ANION GAP SERPL CALC-SCNC: 13 MMOL/L — SIGNIFICANT CHANGE UP (ref 5–17)
AST SERPL-CCNC: 19 U/L — SIGNIFICANT CHANGE UP (ref 10–40)
BASOPHILS NFR BLD AUTO: 0.6 % — SIGNIFICANT CHANGE UP (ref 0–2)
BILIRUB SERPL-MCNC: 0.4 MG/DL — SIGNIFICANT CHANGE UP (ref 0.2–1.2)
BUN SERPL-MCNC: 17 MG/DL — SIGNIFICANT CHANGE UP (ref 7–23)
CALCIUM SERPL-MCNC: 9 MG/DL — SIGNIFICANT CHANGE UP (ref 8.4–10.5)
CHLORIDE SERPL-SCNC: 102 MMOL/L — SIGNIFICANT CHANGE UP (ref 96–108)
CO2 SERPL-SCNC: 28 MMOL/L — SIGNIFICANT CHANGE UP (ref 22–31)
CREAT SERPL-MCNC: 0.75 MG/DL — SIGNIFICANT CHANGE UP (ref 0.5–1.3)
EOSINOPHIL NFR BLD AUTO: 1.6 % — SIGNIFICANT CHANGE UP (ref 0–6)
EXTRA BLUE TOP TUBE: SIGNIFICANT CHANGE UP
EXTRA SST TUBE: SIGNIFICANT CHANGE UP
GLUCOSE SERPL-MCNC: 179 MG/DL — HIGH (ref 70–99)
HCT VFR BLD CALC: 46 % — SIGNIFICANT CHANGE UP (ref 39–50)
HGB BLD-MCNC: 14.5 G/DL — SIGNIFICANT CHANGE UP (ref 13–17)
LYMPHOCYTES # BLD AUTO: 16.8 % — SIGNIFICANT CHANGE UP (ref 13–44)
MCHC RBC-ENTMCNC: 29.3 PG — SIGNIFICANT CHANGE UP (ref 27–34)
MCHC RBC-ENTMCNC: 31.5 G/DL — LOW (ref 32–36)
MCV RBC AUTO: 92.9 FL — SIGNIFICANT CHANGE UP (ref 80–100)
MONOCYTES NFR BLD AUTO: 6.9 % — SIGNIFICANT CHANGE UP (ref 2–14)
NEUTROPHILS NFR BLD AUTO: 74.1 % — SIGNIFICANT CHANGE UP (ref 43–77)
NT-PROBNP SERPL-SCNC: 211 PG/ML — SIGNIFICANT CHANGE UP (ref 0–300)
PLATELET # BLD AUTO: 223 K/UL — SIGNIFICANT CHANGE UP (ref 150–400)
POTASSIUM SERPL-MCNC: 4.2 MMOL/L — SIGNIFICANT CHANGE UP (ref 3.5–5.3)
POTASSIUM SERPL-SCNC: 4.2 MMOL/L — SIGNIFICANT CHANGE UP (ref 3.5–5.3)
PROT SERPL-MCNC: 7.3 G/DL — SIGNIFICANT CHANGE UP (ref 6–8.3)
RBC # BLD: 4.95 M/UL — SIGNIFICANT CHANGE UP (ref 4.2–5.8)
RBC # FLD: 14.8 % — SIGNIFICANT CHANGE UP (ref 10.3–16.9)
SODIUM SERPL-SCNC: 143 MMOL/L — SIGNIFICANT CHANGE UP (ref 135–145)
WBC # BLD: 8.8 K/UL — SIGNIFICANT CHANGE UP (ref 3.8–10.5)
WBC # FLD AUTO: 8.8 K/UL — SIGNIFICANT CHANGE UP (ref 3.8–10.5)

## 2018-07-11 PROCEDURE — 99285 EMERGENCY DEPT VISIT HI MDM: CPT

## 2018-07-11 PROCEDURE — 99223 1ST HOSP IP/OBS HIGH 75: CPT | Mod: GC

## 2018-07-11 RX ORDER — SODIUM CHLORIDE 9 MG/ML
1000 INJECTION, SOLUTION INTRAVENOUS
Qty: 0 | Refills: 0 | Status: DISCONTINUED | OUTPATIENT
Start: 2018-07-11 | End: 2018-07-14

## 2018-07-11 RX ORDER — RIVAROXABAN 15 MG-20MG
20 KIT ORAL EVERY 24 HOURS
Qty: 0 | Refills: 0 | Status: DISCONTINUED | OUTPATIENT
Start: 2018-07-12 | End: 2018-07-14

## 2018-07-11 RX ORDER — VANCOMYCIN HCL 1 G
1000 VIAL (EA) INTRAVENOUS ONCE
Qty: 0 | Refills: 0 | Status: COMPLETED | OUTPATIENT
Start: 2018-07-11 | End: 2018-07-11

## 2018-07-11 RX ORDER — GLUCAGON INJECTION, SOLUTION 0.5 MG/.1ML
1 INJECTION, SOLUTION SUBCUTANEOUS ONCE
Qty: 0 | Refills: 0 | Status: DISCONTINUED | OUTPATIENT
Start: 2018-07-11 | End: 2018-07-14

## 2018-07-11 RX ORDER — FUROSEMIDE 40 MG
40 TABLET ORAL ONCE
Qty: 0 | Refills: 0 | Status: COMPLETED | OUTPATIENT
Start: 2018-07-11 | End: 2018-07-11

## 2018-07-11 RX ORDER — INSULIN LISPRO 100/ML
VIAL (ML) SUBCUTANEOUS
Qty: 0 | Refills: 0 | Status: DISCONTINUED | OUTPATIENT
Start: 2018-07-11 | End: 2018-07-14

## 2018-07-11 RX ORDER — IPRATROPIUM/ALBUTEROL SULFATE 18-103MCG
3 AEROSOL WITH ADAPTER (GRAM) INHALATION EVERY 12 HOURS
Qty: 0 | Refills: 0 | Status: DISCONTINUED | OUTPATIENT
Start: 2018-07-11 | End: 2018-07-12

## 2018-07-11 RX ORDER — DEXTROSE 50 % IN WATER 50 %
15 SYRINGE (ML) INTRAVENOUS ONCE
Qty: 0 | Refills: 0 | Status: DISCONTINUED | OUTPATIENT
Start: 2018-07-11 | End: 2018-07-14

## 2018-07-11 RX ORDER — METOPROLOL TARTRATE 50 MG
100 TABLET ORAL DAILY
Qty: 0 | Refills: 0 | Status: DISCONTINUED | OUTPATIENT
Start: 2018-07-11 | End: 2018-07-14

## 2018-07-11 RX ORDER — DEXTROSE 50 % IN WATER 50 %
12.5 SYRINGE (ML) INTRAVENOUS ONCE
Qty: 0 | Refills: 0 | Status: DISCONTINUED | OUTPATIENT
Start: 2018-07-11 | End: 2018-07-14

## 2018-07-11 RX ORDER — TIOTROPIUM BROMIDE 18 UG/1
1 CAPSULE ORAL; RESPIRATORY (INHALATION) DAILY
Qty: 0 | Refills: 0 | Status: DISCONTINUED | OUTPATIENT
Start: 2018-07-11 | End: 2018-07-14

## 2018-07-11 RX ORDER — HEPARIN SODIUM 5000 [USP'U]/ML
7500 INJECTION INTRAVENOUS; SUBCUTANEOUS EVERY 8 HOURS
Qty: 0 | Refills: 0 | Status: DISCONTINUED | OUTPATIENT
Start: 2018-07-11 | End: 2018-07-12

## 2018-07-11 RX ORDER — ATORVASTATIN CALCIUM 80 MG/1
40 TABLET, FILM COATED ORAL AT BEDTIME
Qty: 0 | Refills: 0 | Status: DISCONTINUED | OUTPATIENT
Start: 2018-07-11 | End: 2018-07-14

## 2018-07-11 RX ADMIN — Medication 250 MILLIGRAM(S): at 19:12

## 2018-07-11 RX ADMIN — Medication 40 MILLIGRAM(S): at 19:12

## 2018-07-11 RX ADMIN — ATORVASTATIN CALCIUM 40 MILLIGRAM(S): 80 TABLET, FILM COATED ORAL at 23:43

## 2018-07-11 NOTE — ED ADULT NURSE REASSESSMENT NOTE - NS ED NURSE REASSESS COMMENT FT1
Pt resting in bed with oxygen 2l iters seen by resident. Pt is resting in bed  and states he is comfortable has minimal shortness of breath which is his baseline. Patient is not using his accessory muscles and is not in distress

## 2018-07-11 NOTE — H&P ADULT - PROBLEM SELECTOR PLAN 5
Pt meeting criteria for COPD exacerbation with increased SOB, coughing and mucus production. Unclear home meds at this time. No wheezing on exam.  - duonebs q12h  - c/w tiotropium  - f/u collateral with pharmacy in AM    #likely UTE  - CPAP at night Pt meeting criteria for COPD exacerbation with increased SOB, coughing and mucus production. Unclear home meds at this time. No wheezing on exam.  - duonebs q12h  - c/w tiotropium      #likely UTE  - CPAP at night Pt meeting criteria for COPD exacerbation with increased SOB, coughing and mucus production. Unclear home meds at this time. No wheezing on exam.  - duonebs q12h  - c/w tiotropium  - azithromycin 250mg  - prednisone 40mg x 5 days      #likely UTE  - CPAP at night - c/w home Lipitor PT with h/o Afib, currently rate-controlled. States takes "fast heart rate medications"  - c/w metoprolol with holding parameters  - CHADSVASC score 2, will continue AC with Xarelto  - holding digoxin pending dig level in AM    - obtain collateral w/ pharmacy in AM regarding current meds and also try to find PCP prescriber

## 2018-07-11 NOTE — H&P ADULT - PROBLEM SELECTOR PLAN 4
- c/w home Lipitor PT with h/o Afib, currently rate-controlled. States takes "fast heart rate medications"  - c/w metoprolol with holding parameters  - CHADSVASC score 2, will continue AC with Xarelto  - holding digoxin pending dig level in AM    - obtain collateral w/ pharmacy in AM regarding current meds and also try to find PCP prescriber Pt meeting criteria for COPD exacerbation with increased SOB, coughing and mucus production. Unclear home meds at this time. No wheezing on exam.  - duonebs q12h  - c/w tiotropium  - azithromycin 250mg  - prednisone 40mg x 5 days  -PULM consult    #likely UTE  - CPAP at night  -PULM CONSULT, may need ECHO

## 2018-07-11 NOTE — H&P ADULT - PROBLEM SELECTOR PLAN 7
F: none  E: replete PRN  N: DASH diet (pending A1c) A1c 7.5, takes metformin 500 BID at home  - ISS, moderate  - consistent carb diet BP low at this time at 100/70s. Holding home: lisinopril 10mg, spironolactone 25mg, diltiazem 240  - c/w home metoprolol  - restart meds PRN    #BPH  - c/w home finasteride

## 2018-07-11 NOTE — H&P ADULT - PROBLEM SELECTOR PROBLEM 9
Transition of care performed with sharing of clinical summary Prophylactic measure Nutrition, metabolism, and development symptoms

## 2018-07-11 NOTE — H&P ADULT - PROBLEM SELECTOR PLAN 3
PT with h/o Afib, currently rate-controlled. States takes "fast heart rate medications"  - c/w metoprolol with holding parameters  - CHADSVASC score 2, will continue AC with Xarelto  - obtain collateral w/ pharmacy in AM regarding current meds and also try to find PCP prescriber PT with h/o Afib, currently rate-controlled. States takes "fast heart rate medications"  - c/w metoprolol with holding parameters  - CHADSVASC score 2, will continue AC with Xarelto  - holding digoxin pending dig level in AM    - obtain collateral w/ pharmacy in AM regarding current meds and also try to find PCP prescriber Pt with h/o CHF with EF 50% back in 2016. Was given Lasix 40 IVP in ED. On physical exam, lungs clear with nonpitting LE edema. Does not appear to be in CHF exacerbation.  - obtain collateral with pharmacy regarding home PO Lasix Pt with h/o CHF with EF 50% back in 2016. Was given Lasix 40 IVP in ED. On physical exam, lungs clear with nonpitting LE edema. Does not appear to be in CHF exacerbation.  - obtain collateral with pharmacy regarding home PO Lasix, appears pt is only on spironolactone per outside med review

## 2018-07-11 NOTE — ED PROVIDER NOTE - MEDICAL DECISION MAKING DETAILS
rle swelling and weeping edema, w/ erythema, hx of copd/chf, possible combination of chronic LE swelling now w/ poor wound healing/ulcer, will check labs, pro-bnp, lower suspicion for ADHF, will likely need abx and wound care, poor follow up as outpatient per previous records and pt states has not followed w/ anyone since last hospitalization for similar condition

## 2018-07-11 NOTE — H&P ADULT - NSHPLABSRESULTS_GEN_ALL_CORE
.  LABS:                         14.5   8.8   )-----------( 223      ( 11 Jul 2018 17:45 )             46.0     07-11    143  |  102  |  17  ----------------------------<  179<H>  4.2   |  28  |  0.75    Ca    9.0      11 Jul 2018 17:45    TPro  7.3  /  Alb  4.0  /  TBili  0.4  /  DBili  x   /  AST  19  /  ALT  25  /  AlkPhos  41  07-11            Serum Pro-Brain Natriuretic Peptide: 211 pg/mL (07-11 @ 17:45)        RADIOLOGY, EKG & ADDITIONAL TESTS: Reviewed.

## 2018-07-11 NOTE — ED PROVIDER NOTE - PHYSICAL EXAMINATION
CON: ao x 3, HENMT: clear oropharynx, soft neck, HEAD: atraumatic, CV: tachycardia, RESP: cta b/l, GI: +BS, obese abd but soft, SKIN: RLE w/ 2 large wound ulcers, weeping, w/ surrounding erythema, +tenderness, noted erythema to LLE w/o ulcer, appears to be c/w chronic venous insufficiency, MSK: no deformities, NEURO: no gross motor or sensory deficit, ambulating

## 2018-07-11 NOTE — ED ADULT NURSE NOTE - PMH
Afib    CHF (congestive heart failure)    COPD (chronic obstructive pulmonary disease)    Essential hypertension  Hypertension  Hyperlipidemia  HLD (hyperlipidemia)

## 2018-07-11 NOTE — H&P ADULT - ASSESSMENT
59yo M current smoker (50 pack years) PMH COPD (not on home O2), HTN, HLD, Afib (on Xarelto), CHF (EF 50% 2/2016) presenting with b/l LE pain L>R. 57yo M current smoker (50 pack years) PMH COPD (not on home O2), HTN, HLD, Afib (on Xarelto), CHF (EF 50% 2/2016) presenting with b/l LE pain R>L

## 2018-07-11 NOTE — H&P ADULT - PROBLEM SELECTOR PLAN 1
Superficial Stage 2 ulcer of LLE and Stage 2-3 of RLE, nonpurulent. Received IV Vancomycin 1g x1 in ED.   - wound consult Superficial Stage 2 ulcer of LLE and Stage 2-3 of RLE, nonpurulent. Received IV Vancomycin 1g x1 in ED.   - wound consult  - cefazolin as nonpurulent mild cellulitis Superficial Stage 2 ulcer of LLE and Stage 2-3 of RLE, nonpurulent. Received IV Vancomycin 1g x1 in ED.   - wound consult  - ancef as nonpurulent mild cellulitis -cellulitis from right lower leg extending upwards to right medial thigh region, started on ancef , monitor for improvement

## 2018-07-11 NOTE — H&P ADULT - PROBLEM SELECTOR PLAN 6
BP low at this time at 100/70s. Holding home: lisinopril 10mg, spironolactone 25mg, diltiazem 240  - c/w home metoprolol  - restart meds PRN    #BPH  - c/w home finasteride Pt meeting criteria for COPD exacerbation with increased SOB, coughing and mucus production. Unclear home meds at this time. No wheezing on exam.  - duonebs q12h  - c/w tiotropium  - azithromycin 250mg  - prednisone 40mg x 5 days      #likely UTE  - CPAP at night

## 2018-07-11 NOTE — ED PROVIDER NOTE - OBJECTIVE STATEMENT
58 yom pw leg ulcer.  hx of CHF w/ EF 50s, COPD, chronic venous insufficiency.  states ongoing wound for weeks, worsening today, w/ drainage, and pain.  currently lives with friend.  no fc.  no trauma.

## 2018-07-11 NOTE — H&P ADULT - HISTORY OF PRESENT ILLNESS
57yo M current smoker (50 pack years) PMH COPD (not on home O2), HTN, HLD, Afib (on Xarelto?), CHF (EF 50% 2/2016) presenting with b/l LE pain L>R. Has had chronic erythema of b/l LE since 2016 however comes in today due to worsening lower extremity pain. Pt states that 10 days ago, he noticed vesicles on his LLE and proceeded to pop it, with worsening redness of his LLE. Then noticed RLE vesicles which he also opened with progressing ulceration of his RLE and also associated pain. States that his pain is 10/10 burning pain. States that he saw a doctor in [place] but is unable to say the PCP's name. States he lives with a friend in [place] (able to state his [place] address) named Dallas Ham but does not want to share his number. Has no family here. Endorses headaches which last 3-4 hrs a day ongoing for 3-4 months, occurring every day and relieved with 2 Advils. Recently had a sleep study in a [place] [facility] but does not know the results. Also states that he had a mechanical fall prior to entering the ED with resultant L elbow superficial skin tear. Endorses chronic fatigue, occasional SOB, cough and increased clear sputum production. Denies any fevers, chills, chest pain, abdominal pain, diarrhea/constipation n/v, dysuria. 57yo M current smoker (50 pack years) PMH COPD (not on home O2), HTN, HLD, Afib (on Xarelto?), CHF (EF 50% 2/2016) presenting with b/l LE pain L>R. Has had chronic erythema of b/l LE since 2016 however comes in today due to worsening lower extremity pain. Pt states that 10 days ago, he noticed vesicles on his LLE and proceeded to pop it, with worsening redness of his LLE. Then noticed RLE vesicles which he also opened with progressing ulceration of his RLE and also associated pain. States that his pain is 10/10 burning pain. States that he saw a doctor in [place] but is unable to say the PCP's name. States he lives with a friend in [place] (able to state his [place] address) named Dallas Leigh but does not want to share his number. Has no family here. Endorses headaches which last 3-4 hrs a day ongoing for 3-4 months, occurring every day and relieved with 2 Advils. Recently had a sleep study in a [facility] but does not know the results. Also states that he had a mechanical fall prior to entering the ED with resultant L elbow superficial skin tear. Endorses chronic fatigue, occasional SOB, cough and increased clear sputum production. Denies any fevers, chills, chest pain, abdominal pain, diarrhea/constipation n/v, dysuria.      ED Vitals: T 98.4F, /105, HR 96, RR 17, O2 90% on RA, then placed on 2L with O2 95%  ED Given: Vanc 1g x1, IV Lasix 40 x1

## 2018-07-11 NOTE — H&P ADULT - PROBLEM SELECTOR PLAN 9
Did not reach PCP as unclear who PCP is, to obtain in AM. HSQ F: none  E: replete PRN  N: DASH diet (pending A1c)    PPX: HSQ

## 2018-07-11 NOTE — H&P ADULT - NSHPPHYSICALEXAM_GEN_ALL_CORE
.  VITAL SIGNS:  T(C): 36.9 (07-11-18 @ 21:30), Max: 36.9 (07-11-18 @ 16:25)  T(F): 98.4 (07-11-18 @ 21:30), Max: 98.4 (07-11-18 @ 16:25)  HR: 88 (07-11-18 @ 21:30) (88 - 114)  BP: 100/70 (07-11-18 @ 21:30) (100/70 - 179/105)  BP(mean): --  RR: 22 (07-11-18 @ 21:30) (17 - 22)  SpO2: 95% (07-11-18 @ 21:30) (90% - 95%)  Wt(kg): --    PHYSICAL EXAM:    Constitutional: obese elderly male, disheveled and malodorous, half lying down in bed in NAD  Head: NC/AT, missing multiple teeth, poor oral hygiene  Eyes: PERRLA, EOMI, anicteric sclera  ENT: no nasal discharge; MMM  Neck: suppl  Respiratory: CTA B/L; no wheezing or retractions, on 2L NC  Cardiac: +S1/S2; RRR; no M/R/G; PMI non-displaced  Gastrointestinal: abdomen nontender, distended obese; no rebound or guarding; +BSx4  Back: spine midline, no bony tenderness or step-offs; no CVAT B/L  Extremities: WWP, b/l LE nonpitting edema of anterior shins, erythema of anterior shins. L shin with chronic venous stasis changes and two vesicles medially. R shin with large open wound nonpurulent and superficial, with granulation tissue.  Musculoskeletal: NROM x4; no joint swelling, tenderness or erythema  Vascular: 2+ radial, DP pulses B/L  Neurologic: AAOx3; CNII-XII grossly intact; no focal deficits  Psychiatric: affect and characteristics of appearance, verbalizations, behaviors are appropriate

## 2018-07-11 NOTE — H&P ADULT - PROBLEM SELECTOR PLAN 2
Pt with h/o CHF with EF 50% back in 2016. Was given Lasix 40 IVP in ED. On physical exam, lungs clear with nonpitting LE edema. Does not appear to be in CHF exacerbation.  - obtain collateral with pharmacy regarding home PO Lasix Superficial Stage 2 ulcer of LLE and Stage 2-3 of RLE, nonpurulent. Received IV Vancomycin 1g x1 in ED.   - wound consult  - ancef as nonpurulent mild cellulitis

## 2018-07-11 NOTE — H&P ADULT - NSHPSOCIALHISTORY_GEN_ALL_CORE
Does not drink alcohol, does not use drugs  Smokes cigarettes about 8 a day (chronic active smoker 50-pack-years)  Unemployed  Lives in [place] with friend

## 2018-07-11 NOTE — H&P ADULT - PROBLEM SELECTOR PLAN 8
HSQ F: none  E: replete PRN  N: DASH diet (pending A1c) A1c 7.5, takes metformin 500 BID at home  - ISS, moderate  - consistent carb diet - c/w home Lipitor

## 2018-07-11 NOTE — H&P ADULT - ATTENDING COMMENTS
patient seen and examined in ED holding and again on regional floor    reviewed vs, labs,     agree w/ PE findings as above w/ additions/ exceptions/ pertinent findings:     1. right leg cellulitis/ leg ulcer  2. COPD  3. UTE   4. CHF:   5. HTN: SBP in 100s on admission, hold home BP meds except for metoprolol         rest of plan as above patient seen and examined in ED holding and again on regional floor    reviewed vs, labs    agree w/ PE findings as above w/ additions/ exceptions/ pertinent findings:  pt is dishevelled, malodorous, morbidly obese, perrla, MMM, lungs w/ mild rhonchi at bases b/l; s1s2, very large protuberant abdomen w/ umbilical hernia, nontender; +lower ext dry skin w/ venous stasis changes at left lower ext; superficial ulcer w/ surrounding erythema at right lower ext; erythema is mildly tender and extends fading out in intensity at the right medial thigh region; right lower ext appears larger than left    1. right leg cellulitis/ leg ulcer: per outside medication reviewed pt was started on clindamycin PO recently, has been on topical medications as well; started on Ancef, monitor for improvement, check lower ext dopplers  2. COPD: pt reported worsening lung sxs to admitting resident, started on azithromycin and prednisone, duonebs prn   3. UTE : suspected base on pox, body habitus, lethagy, respiratory sxs; started on CPAP overnight however pt removed CPAP; would c/w o2 supplementation, as pt not used CPAP check ABG, consult pulm for COPD and UTE   4. CHF: on spironolactone on outside med review; holding for now given low BP, monitor sxs  5. HTN: SBP in 100s on admission, hold home BP meds except for metoprolol     rest of plan as above patient seen and examined in ED holding and again on regional floor    reviewed vs, labs    agree w/ PE findings as above w/ additions/ exceptions/ pertinent findings:  pt is dishevelled, malodorous, morbidly obese, perrla, MMM, lungs w/ mild rhonchi at bases b/l; s1s2, very large protuberant abdomen w/ umbilical hernia, nontender; +lower ext dry skin w/ venous stasis changes at left lower ext; superficial ulcer w/ surrounding erythema at right lower ext; erythema is mildly tender and extends fading out in intensity at the right medial thigh region; right lower ext appears larger than left    1. right leg cellulitis/ leg ulcer: per outside medication reviewed pt was started on clindamycin PO recently, has been on topical medications as well; started on Ancef, monitor for improvement, check lower ext dopplers; wound care consult   2. COPD: pt reported worsening lung sxs to admitting resident, started on azithromycin and prednisone, duonebs prn   3. UTE : suspected base on pox, body habitus, lethagy, respiratory sxs; started on CPAP overnight however pt removed CPAP; would c/w o2 supplementation, as pt not used CPAP check ABG, consult pulm for COPD and UTE   4. CHF: on spironolactone on outside med review; holding for now given low BP, monitor sxs  5. HTN: SBP in 100s on admission, hold home BP meds except for metoprolol     rest of plan as above

## 2018-07-11 NOTE — ED ADULT NURSE NOTE - OBJECTIVE STATEMENT
57 y/o male c/o bilateral leg swelling and redness that began a few days ago w/o associated symptoms. Denies fever, chills, chest pain, SOB, abdominal pain, and any other associated symptoms.

## 2018-07-12 ENCOUNTER — TRANSCRIPTION ENCOUNTER (OUTPATIENT)
Age: 58
End: 2018-07-12

## 2018-07-12 DIAGNOSIS — E11.9 TYPE 2 DIABETES MELLITUS WITHOUT COMPLICATIONS: ICD-10-CM

## 2018-07-12 DIAGNOSIS — L03.115 CELLULITIS OF RIGHT LOWER LIMB: ICD-10-CM

## 2018-07-12 LAB
ANION GAP SERPL CALC-SCNC: 12 MMOL/L — SIGNIFICANT CHANGE UP (ref 5–17)
APTT BLD: 28.8 SEC — SIGNIFICANT CHANGE UP (ref 27.5–37.4)
BUN SERPL-MCNC: 18 MG/DL — SIGNIFICANT CHANGE UP (ref 7–23)
CALCIUM SERPL-MCNC: 8.9 MG/DL — SIGNIFICANT CHANGE UP (ref 8.4–10.5)
CHLORIDE SERPL-SCNC: 99 MMOL/L — SIGNIFICANT CHANGE UP (ref 96–108)
CO2 SERPL-SCNC: 31 MMOL/L — SIGNIFICANT CHANGE UP (ref 22–31)
CREAT SERPL-MCNC: 0.86 MG/DL — SIGNIFICANT CHANGE UP (ref 0.5–1.3)
DIGOXIN SERPL-MCNC: <0.3 NG/ML — LOW (ref 0.8–2)
GAS PNL BLDA: SIGNIFICANT CHANGE UP
GLUCOSE BLDC GLUCOMTR-MCNC: 160 MG/DL — HIGH (ref 70–99)
GLUCOSE BLDC GLUCOMTR-MCNC: 164 MG/DL — HIGH (ref 70–99)
GLUCOSE BLDC GLUCOMTR-MCNC: 183 MG/DL — HIGH (ref 70–99)
GLUCOSE BLDC GLUCOMTR-MCNC: 214 MG/DL — HIGH (ref 70–99)
GLUCOSE SERPL-MCNC: 197 MG/DL — HIGH (ref 70–99)
HCT VFR BLD CALC: 48.5 % — SIGNIFICANT CHANGE UP (ref 39–50)
HGB BLD-MCNC: 14.8 G/DL — SIGNIFICANT CHANGE UP (ref 13–17)
INR BLD: 1.12 — SIGNIFICANT CHANGE UP (ref 0.88–1.16)
MAGNESIUM SERPL-MCNC: 2.1 MG/DL — SIGNIFICANT CHANGE UP (ref 1.6–2.6)
MCHC RBC-ENTMCNC: 28.8 PG — SIGNIFICANT CHANGE UP (ref 27–34)
MCHC RBC-ENTMCNC: 30.5 G/DL — LOW (ref 32–36)
MCV RBC AUTO: 94.5 FL — SIGNIFICANT CHANGE UP (ref 80–100)
PHOSPHATE SERPL-MCNC: 3.6 MG/DL — SIGNIFICANT CHANGE UP (ref 2.5–4.5)
PLATELET # BLD AUTO: 244 K/UL — SIGNIFICANT CHANGE UP (ref 150–400)
POTASSIUM SERPL-MCNC: 4.5 MMOL/L — SIGNIFICANT CHANGE UP (ref 3.5–5.3)
POTASSIUM SERPL-SCNC: 4.5 MMOL/L — SIGNIFICANT CHANGE UP (ref 3.5–5.3)
PROTHROM AB SERPL-ACNC: 12.5 SEC — SIGNIFICANT CHANGE UP (ref 9.8–12.7)
RBC # BLD: 5.13 M/UL — SIGNIFICANT CHANGE UP (ref 4.2–5.8)
RBC # FLD: 15 % — SIGNIFICANT CHANGE UP (ref 10.3–16.9)
SODIUM SERPL-SCNC: 142 MMOL/L — SIGNIFICANT CHANGE UP (ref 135–145)
TSH SERPL-MCNC: 0.86 UIU/ML — SIGNIFICANT CHANGE UP (ref 0.35–4.94)
WBC # BLD: 9.9 K/UL — SIGNIFICANT CHANGE UP (ref 3.8–10.5)
WBC # FLD AUTO: 9.9 K/UL — SIGNIFICANT CHANGE UP (ref 3.8–10.5)

## 2018-07-12 PROCEDURE — 99233 SBSQ HOSP IP/OBS HIGH 50: CPT | Mod: GC

## 2018-07-12 PROCEDURE — 71045 X-RAY EXAM CHEST 1 VIEW: CPT | Mod: 26

## 2018-07-12 RX ORDER — CEFAZOLIN SODIUM 1 G
1000 VIAL (EA) INJECTION EVERY 8 HOURS
Qty: 0 | Refills: 0 | Status: DISCONTINUED | OUTPATIENT
Start: 2018-07-12 | End: 2018-07-12

## 2018-07-12 RX ORDER — FINASTERIDE 5 MG/1
1 TABLET, FILM COATED ORAL
Qty: 0 | Refills: 0 | COMMUNITY

## 2018-07-12 RX ORDER — FUROSEMIDE 40 MG
20 TABLET ORAL DAILY
Qty: 0 | Refills: 0 | Status: DISCONTINUED | OUTPATIENT
Start: 2018-07-12 | End: 2018-07-14

## 2018-07-12 RX ORDER — CEFAZOLIN SODIUM 1 G
VIAL (EA) INJECTION
Qty: 0 | Refills: 0 | Status: DISCONTINUED | OUTPATIENT
Start: 2018-07-12 | End: 2018-07-12

## 2018-07-12 RX ORDER — FINASTERIDE 5 MG/1
5 TABLET, FILM COATED ORAL DAILY
Qty: 0 | Refills: 0 | Status: DISCONTINUED | OUTPATIENT
Start: 2018-07-12 | End: 2018-07-14

## 2018-07-12 RX ORDER — LISINOPRIL 2.5 MG/1
1 TABLET ORAL
Qty: 0 | Refills: 0 | COMMUNITY

## 2018-07-12 RX ORDER — VANCOMYCIN HCL 1 G
1500 VIAL (EA) INTRAVENOUS EVERY 12 HOURS
Qty: 0 | Refills: 0 | Status: DISCONTINUED | OUTPATIENT
Start: 2018-07-12 | End: 2018-07-13

## 2018-07-12 RX ORDER — IBUPROFEN 200 MG
200 TABLET ORAL EVERY 8 HOURS
Qty: 0 | Refills: 0 | Status: DISCONTINUED | OUTPATIENT
Start: 2018-07-12 | End: 2018-07-14

## 2018-07-12 RX ORDER — IPRATROPIUM/ALBUTEROL SULFATE 18-103MCG
3 AEROSOL WITH ADAPTER (GRAM) INHALATION EVERY 6 HOURS
Qty: 0 | Refills: 0 | Status: DISCONTINUED | OUTPATIENT
Start: 2018-07-12 | End: 2018-07-14

## 2018-07-12 RX ORDER — DIGOXIN 250 MCG
0.12 TABLET ORAL DAILY
Qty: 0 | Refills: 0 | Status: DISCONTINUED | OUTPATIENT
Start: 2018-07-12 | End: 2018-07-14

## 2018-07-12 RX ORDER — METFORMIN HYDROCHLORIDE 850 MG/1
1 TABLET ORAL
Qty: 0 | Refills: 0 | COMMUNITY

## 2018-07-12 RX ORDER — CEFAZOLIN SODIUM 1 G
1000 VIAL (EA) INJECTION ONCE
Qty: 0 | Refills: 0 | Status: COMPLETED | OUTPATIENT
Start: 2018-07-12 | End: 2018-07-12

## 2018-07-12 RX ORDER — AZITHROMYCIN 500 MG/1
250 TABLET, FILM COATED ORAL DAILY
Qty: 0 | Refills: 0 | Status: DISCONTINUED | OUTPATIENT
Start: 2018-07-12 | End: 2018-07-14

## 2018-07-12 RX ADMIN — Medication 200 MILLIGRAM(S): at 09:52

## 2018-07-12 RX ADMIN — AZITHROMYCIN 250 MILLIGRAM(S): 500 TABLET, FILM COATED ORAL at 11:30

## 2018-07-12 RX ADMIN — FINASTERIDE 5 MILLIGRAM(S): 5 TABLET, FILM COATED ORAL at 11:30

## 2018-07-12 RX ADMIN — Medication 250 MILLIGRAM(S): at 15:30

## 2018-07-12 RX ADMIN — Medication 100 MILLIGRAM(S): at 05:44

## 2018-07-12 RX ADMIN — Medication 2: at 12:16

## 2018-07-12 RX ADMIN — Medication 3 MILLILITER(S): at 16:18

## 2018-07-12 RX ADMIN — Medication 200 MILLIGRAM(S): at 10:45

## 2018-07-12 RX ADMIN — Medication 20 MILLIGRAM(S): at 01:08

## 2018-07-12 RX ADMIN — Medication 3 MILLILITER(S): at 22:05

## 2018-07-12 RX ADMIN — Medication 2: at 18:12

## 2018-07-12 RX ADMIN — Medication 4: at 08:34

## 2018-07-12 RX ADMIN — HEPARIN SODIUM 7500 UNIT(S): 5000 INJECTION INTRAVENOUS; SUBCUTANEOUS at 05:42

## 2018-07-12 RX ADMIN — Medication 40 MILLIGRAM(S): at 22:05

## 2018-07-12 RX ADMIN — Medication 100 MILLIGRAM(S): at 05:53

## 2018-07-12 RX ADMIN — Medication 2: at 22:05

## 2018-07-12 RX ADMIN — Medication 20 MILLIGRAM(S): at 12:16

## 2018-07-12 RX ADMIN — ATORVASTATIN CALCIUM 40 MILLIGRAM(S): 80 TABLET, FILM COATED ORAL at 22:05

## 2018-07-12 RX ADMIN — TIOTROPIUM BROMIDE 1 CAPSULE(S): 18 CAPSULE ORAL; RESPIRATORY (INHALATION) at 09:52

## 2018-07-12 RX ADMIN — Medication 0.12 MILLIGRAM(S): at 09:52

## 2018-07-12 NOTE — PHYSICAL THERAPY INITIAL EVALUATION ADULT - GAIT DISTANCE, PT EVAL
5 ft x 2 5 ft x 2; increased gait training deferred 2/2 HR noted to 140 bpm and OOB to chair only as per MD Díaz

## 2018-07-12 NOTE — DISCHARGE NOTE ADULT - ADDITIONAL INSTRUCTIONS
Please follow up with you primary care doctor, Dr. Rigoberto Hilliard (Medicine) [phone]  [address] [place], [place] [id number] Please follow up with you primary care doctor, Dr. Rigoberto Hilliard (Medicine) [phone]. An appointment has been made for you on  Mon 7/23 @8am.   [address] [place] [id number]

## 2018-07-12 NOTE — PROGRESS NOTE ADULT - ATTENDING COMMENTS
pt seen and examined by me. appear well  denies sob/cp  alert oriented x 3 talking  no accessory muscle use    1- Hypercapneic respiratory failure- POA-  pt looks well  on supplemental oxygen  refusing BIPAP  c/w systemic steroids  nebs  check Echo  2- Acute on chronic Diastolic CHF- c/w lasix  3- Cellulitis- c/w vancomycin  4- morbid obesity

## 2018-07-12 NOTE — PHYSICAL THERAPY INITIAL EVALUATION ADULT - IMPAIRMENTS FOUND, PT EVAL
gait, locomotion, and balance/aerobic capacity/endurance/muscle strength/posture/decreased activity tolerance

## 2018-07-12 NOTE — DISCHARGE NOTE ADULT - PATIENT PORTAL LINK FT
You can access the [facility] Patient Portal, offered by [facility], by registering with the following website: [email or web address]

## 2018-07-12 NOTE — PHYSICAL THERAPY INITIAL EVALUATION ADULT - LEVEL OF INDEPENDENCE: STAND/SIT, REHAB EVAL
supervision/Fairly unsteady with poor eccentric control; SpO2 4LO2NC sitting EOB (post treatment): 96% 4LO2NC

## 2018-07-12 NOTE — PROGRESS NOTE ADULT - PROBLEM SELECTOR PLAN 1
-cellulitis of right lower extremity extending from ankle to knee  - started on ancef- switched to vancomycin for MRSA coverage in context  of patients comorbidities  - monitor for improvement

## 2018-07-12 NOTE — PHYSICAL THERAPY INITIAL EVALUATION ADULT - LEVEL OF INDEPENDENCE: GAIT, REHAB EVAL
supervision/Safe and steady with ambulation with no LOBs noted; pt walked between bed and chair x 2 with a ~1 min seated rest break in bedside chair. SpO2 4LO2NC monitored via pulse ox throughout: Seated rest break SpO2 94% 4LO2NC/Sitting EOB at end of treatment: 96% 4LO2NC

## 2018-07-12 NOTE — DISCHARGE NOTE ADULT - SECONDARY DIAGNOSIS.
Chronic obstructive pulmonary disease with acute exacerbation Chronic diastolic congestive heart failure Hyperlipidemia, unspecified hyperlipidemia type Essential hypertension Type 2 diabetes mellitus without complication, without long-term current use of insulin Atrial fibrillation, unspecified type

## 2018-07-12 NOTE — PROGRESS NOTE ADULT - PROBLEM SELECTOR PLAN 3
Pt with h/o CHF with EF 50% back in 2016. Was given Lasix 40 IVP in ED. On physical exam, lungs clear with nonpitting LE edema. Does not appear to be in CHF exacerbation.  - started PO Lasix for b/l LE edema  - will contact PMD for further collateral - Rigoberto Hilliard -[phone] Pt meeting criteria for COPD exacerbation with increased SOB, coughing and increased clear sputum production  - increased duonebs q6h as pt was wheezing earlier this morning  - c/w tiotropium  - azithromycin 250mg  - c/w prednisone 40mg x 5 days  - f/u CXR results  - abg    #likely UTE  - CPAP at night  - f/u results of echo

## 2018-07-12 NOTE — DISCHARGE NOTE ADULT - PROVIDER TOKENS
FREE:[LAST:[Babak],FIRST:[Rigoberto],PHONE:[phone]],FAX:[phone]],ADDRESS:[[address] [place] [address]]]

## 2018-07-12 NOTE — PROGRESS NOTE ADULT - PROBLEM SELECTOR PLAN 2
Superficial Stage 2 ulcer of LLE and Stage 2-3 of RLE, nonpurulent.   - c/w vancomycin  - f/u wound consult  - ancef as nonpurulent mild cellulitis

## 2018-07-12 NOTE — PHYSICAL THERAPY INITIAL EVALUATION ADULT - THERAPY FREQUENCY, PT EVAL
2-3x/week/Patient educated on frequency of inpatient therapy at [facility], patient verbalized understanding.

## 2018-07-12 NOTE — PHYSICAL THERAPY INITIAL EVALUATION ADULT - PERTINENT HX OF CURRENT PROBLEM, REHAB EVAL
59yo M current smoker (50 pack years) PMH COPD (not on home O2), HTN, HLD, Afib (on Xarelto?), CHF (EF 50% 2/2016) presenting with b/l LE pain L>R. Has had chronic erythema of b/l LE since 2016 however comes in today due to worsening lower extremity pain. Please refer to H&P in [place] for additional details.

## 2018-07-12 NOTE — DISCHARGE NOTE ADULT - MEDICATION SUMMARY - MEDICATIONS TO TAKE
I will START or STAY ON the medications listed below when I get home from the hospital:    finasteride 5 mg oral tablet  -- 1 tab(s) by mouth once a day  -- Indication: For BPH    predniSONE 20 mg oral tablet  -- 2 tab(s) by mouth once a day  -- Indication: For COPD (chronic obstructive pulmonary disease)    spironolactone 25 mg oral tablet  -- 1 tab(s) by mouth once a day  -- Indication: For CHF (congestive heart failure)    lisinopril 10 mg oral tablet  -- 1 tab(s) by mouth once a day  -- Indication: For CHF (congestive heart failure)    digoxin 125 mcg (0.125 mg) oral tablet  -- 1 tab(s) by mouth once a day  -- Indication: For Afib    rivaroxaban 20 mg oral tablet  -- 1 tab(s) by mouth every 24 hours  -- Indication: For Afib    metFORMIN 500 mg oral tablet  -- 1 tab(s) by mouth 2 times a day  -- Indication: For Diabetes    atorvastatin 40 mg oral tablet  -- 1 tab(s) by mouth once a day (at bedtime)  -- Indication: For Hyperlipidemia    metoprolol succinate 100 mg oral tablet, extended release  -- 1 tab(s) by mouth every 12 hours  -- Indication: For Afib    fluticasone-salmeterol 250 mcg-50 mcg inhalation powder  -- 1 puff(s) inhaled 2 times a day  -- Indication: For COPD (chronic obstructive pulmonary disease)    Spiriva 18 mcg inhalation capsule  -- 1 cap(s) inhaled once a day  -- Indication: For COPD (chronic obstructive pulmonary disease)    cephalexin 500 mg oral capsule  -- 1 cap(s) by mouth 4 times a day  -- Indication: For Cellulitis of leg, right    sulfamethoxazole-trimethoprim 800 mg-160 mg oral tablet  -- 1 tab(s) by mouth 2 times a day  -- Indication: For Cellulitis of leg, right

## 2018-07-12 NOTE — PROGRESS NOTE ADULT - SUBJECTIVE AND OBJECTIVE BOX
OVERNIGHT EVENTS: Patient was admitted overnight. No active complaints. Patient frequently removes nasal cannula and desaturates to 85-90, frequent checking and readjusting with O2 sat rising to 94 on 4L NC.     SUBJECTIVE / INTERVAL HPI: Patient seen and examined at bedside. Sitting comfortably, drowsy, complaining of burning 10/10 pain in RLE. Patient endorses cough productive of clear sputum and shortness of breath.    VITAL SIGNS:  Vital Signs Last 24 Hrs  T(C): 36.7 (12 Jul 2018 09:36), Max: 37.3 (12 Jul 2018 01:06)  T(F): 98 (12 Jul 2018 09:36), Max: 99.1 (12 Jul 2018 01:06)  HR: 100 (12 Jul 2018 09:36) (88 - 114)  BP: 144/84 (12 Jul 2018 09:36) (100/70 - 179/105)  BP(mean): --  RR: 20 (12 Jul 2018 09:36) (17 - 22)  SpO2: 93% (12 Jul 2018 09:36) (90% - 96%)    PHYSICAL EXAM:    General: WDWN  HEENT: Facial plethora, +rhinorrhea  Neck: large circumference, supple  Cardiovascular: S1, S2 normal; RRR, no M/G/R  Respiratory: CTABL; no W/R/R  Gastrointestinal: soft, nontender, nondistended. bowel sounds present.  Skin: no ulcerations or visible rashes appreciated  Extremities: WWP; no edema, clubbing or cyanosis  Vascular: 2+ radial, DP/PT pulses B/L  Neurological: AAOx3; CN II-XII grossly intact; no focal deficits    MEDICATIONS:  MEDICATIONS  (STANDING):  ALBUTerol/ipratropium for Nebulization 3 milliLiter(s) Nebulizer every 6 hours  atorvastatin 40 milliGRAM(s) Oral at bedtime  azithromycin   Tablet 250 milliGRAM(s) Oral daily  dextrose 5%. 1000 milliLiter(s) (50 mL/Hr) IV Continuous <Continuous>  dextrose 50% Injectable 12.5 Gram(s) IV Push once  digoxin     Tablet 0.125 milliGRAM(s) Oral daily  finasteride 5 milliGRAM(s) Oral daily  furosemide    Tablet 20 milliGRAM(s) Oral daily  insulin lispro (HumaLOG) corrective regimen sliding scale   SubCutaneous Before meals and at bedtime  metoprolol succinate  milliGRAM(s) Oral daily  predniSONE   Tablet 40 milliGRAM(s) Oral daily  rivaroxaban 20 milliGRAM(s) Oral every 24 hours  tiotropium 18 MICROgram(s) Capsule 1 Capsule(s) Inhalation daily    MEDICATIONS  (PRN):  dextrose 40% Gel 15 Gram(s) Oral once PRN Blood Glucose LESS THAN 70 milliGRAM(s)/deciliter  glucagon  Injectable 1 milliGRAM(s) IntraMuscular once PRN Glucose LESS THAN 70 milligrams/deciliter  ibuprofen  Tablet 200 milliGRAM(s) Oral every 8 hours PRN headache      ALLERGIES:  Allergies    No Known Allergies    Intolerances        LABS:                        14.8   9.9   )-----------( 244      ( 12 Jul 2018 07:10 )             48.5     07-12    142  |  99  |  18  ----------------------------<  197<H>  4.5   |  31  |  0.86    Ca    8.9      12 Jul 2018 07:10  Phos  3.6     07-12  Mg     2.1     07-12    TPro  7.3  /  Alb  4.0  /  TBili  0.4  /  DBili  x   /  AST  19  /  ALT  25  /  AlkPhos  41  07-11    PT/INR - ( 12 Jul 2018 07:10 )   PT: 12.5 sec;   INR: 1.12          PTT - ( 12 Jul 2018 07:10 )  PTT:28.8 sec    CAPILLARY BLOOD GLUCOSE      POCT Blood Glucose.: 160 mg/dL (12 Jul 2018 12:09)      RADIOLOGY & ADDITIONAL TESTS: Reviewed. OVERNIGHT EVENTS: Patient was admitted overnight.     SUBJECTIVE / INTERVAL HPI: Patient seen and examined at bedside. No active complaints. Patient continuing to refuse Bipap and frequently removes nasal cannula and desaturates to 85-90, frequent checking O2 sat and readjusting with O2 sat rising to 94 on 4L NC.  Sitting comfortably, complaining of burning 10/10 pain in RLE. Patient endorses cough productive of clear sputum and some shortness of breath. No other complaints    VITAL SIGNS:  Vital Signs Last 24 Hrs  T(C): 36.7 (12 Jul 2018 09:36), Max: 37.3 (12 Jul 2018 01:06)  T(F): 98 (12 Jul 2018 09:36), Max: 99.1 (12 Jul 2018 01:06)  HR: 100 (12 Jul 2018 09:36) (88 - 114)  BP: 144/84 (12 Jul 2018 09:36) (100/70 - 179/105)  BP(mean): --  RR: 20 (12 Jul 2018 09:36) (17 - 22)  SpO2: 93% (12 Jul 2018 09:36) (90% - 96%)    PHYSICAL EXAM:    General: WDWN  HEENT: Facial plethora, +rhinorrhea  Neck: large circumference, supple  Cardiovascular: S1, S2 normal; RRR, no M/G/R  Respiratory: CTABL; no W/R/R after duoneb  Gastrointestinal: soft, nontender, nondistended. bowel sounds present. +umbilical nonreducible hernia  Skin: LLE- stage 2 ulceration of anterior aspect of LLE, erythema, edema, and warmth extending from ankle to knee; RLL- bullae on medial aspect of lower leg, erythema, edema and warmth from ankle to mid leg; no purulent drainage b/l  Extremities: WWP; no edema, clubbing or cyanosis  Vascular: 2+ radial, DP/PT pulses B/L  Neurological: AAOx3;  no focal deficits    MEDICATIONS:  MEDICATIONS  (STANDING):  ALBUTerol/ipratropium for Nebulization 3 milliLiter(s) Nebulizer every 6 hours  atorvastatin 40 milliGRAM(s) Oral at bedtime  azithromycin   Tablet 250 milliGRAM(s) Oral daily  dextrose 5%. 1000 milliLiter(s) (50 mL/Hr) IV Continuous <Continuous>  dextrose 50% Injectable 12.5 Gram(s) IV Push once  digoxin     Tablet 0.125 milliGRAM(s) Oral daily  finasteride 5 milliGRAM(s) Oral daily  furosemide    Tablet 20 milliGRAM(s) Oral daily  insulin lispro (HumaLOG) corrective regimen sliding scale   SubCutaneous Before meals and at bedtime  metoprolol succinate  milliGRAM(s) Oral daily  predniSONE   Tablet 40 milliGRAM(s) Oral daily  rivaroxaban 20 milliGRAM(s) Oral every 24 hours  tiotropium 18 MICROgram(s) Capsule 1 Capsule(s) Inhalation daily    MEDICATIONS  (PRN):  dextrose 40% Gel 15 Gram(s) Oral once PRN Blood Glucose LESS THAN 70 milliGRAM(s)/deciliter  glucagon  Injectable 1 milliGRAM(s) IntraMuscular once PRN Glucose LESS THAN 70 milligrams/deciliter  ibuprofen  Tablet 200 milliGRAM(s) Oral every 8 hours PRN headache      ALLERGIES:  Allergies    No Known Allergies    Intolerances        LABS:                        14.8   9.9   )-----------( 244      ( 12 Jul 2018 07:10 )             48.5     07-12    142  |  99  |  18  ----------------------------<  197<H>  4.5   |  31  |  0.86    Ca    8.9      12 Jul 2018 07:10  Phos  3.6     07-12  Mg     2.1     07-12    TPro  7.3  /  Alb  4.0  /  TBili  0.4  /  DBili  x   /  AST  19  /  ALT  25  /  AlkPhos  41  07-11    PT/INR - ( 12 Jul 2018 07:10 )   PT: 12.5 sec;   INR: 1.12          PTT - ( 12 Jul 2018 07:10 )  PTT:28.8 sec    CAPILLARY BLOOD GLUCOSE      POCT Blood Glucose.: 160 mg/dL (12 Jul 2018 12:09)      RADIOLOGY & ADDITIONAL TESTS: Reviewed.

## 2018-07-12 NOTE — PHYSICAL THERAPY INITIAL EVALUATION ADULT - ADDITIONAL COMMENTS
PLOF of function received from patient. Reports independence with all ADLs/IADLs. No history of assistive devices. Reports one mechanical fall while leaning against a garbage can.

## 2018-07-12 NOTE — DISCHARGE NOTE ADULT - MEDICATION SUMMARY - MEDICATIONS TO STOP TAKING
I will STOP taking the medications listed below when I get home from the hospital:    amoxicillin-clavulanate 875 mg-125 mg oral tablet  -- 1 tab(s) by mouth every 12 hours    DilTIAZem Hydrochloride  mg/24 hours oral capsule, extended release  -- 1 cap(s) by mouth once a day

## 2018-07-12 NOTE — PROGRESS NOTE ADULT - PROBLEM SELECTOR PLAN 4
Pt meeting criteria for COPD exacerbation with increased SOB, coughing and mucus production. Unclear home meds at this time. No wheezing on exam.  - increased duonebs q6h as pt was wheezing early this morning  - c/w tiotropium  - azithromycin 250mg  - c/w prednisone 40mg x 5 days  - f/u CXR results  - abg    #likely UTE  - CPAP at night  - f/u results of echo Pt with h/o CHF with EF 50% back in 2016. Was given Lasix 40 IVP in ED. On physical exam, lungs clear with nonpitting LE edema. Does not appear to be in CHF exacerbation.  - started PO Lasix for b/l LE edema  - f/u echo  - will contact PMD for further collateral - Rigoberto Hilliard -[phone]

## 2018-07-12 NOTE — CHART NOTE - NSCHARTNOTEFT_GEN_A_CORE
Patient refusing all interventions except antibiotics. Refusing bipap, O2NC, echo, verbally abusive to staff, demanding to leave AMA. Approached patient in room, currently receiving IV abx treatment,  bleeding from mouth after biting lip, agitated, yelling at nursing staff demanding to leave the hospital. Patient was encouraged to remain in the hospital and comply with recommended course of treatment and recommended studies. Pt was educated on risks of leaving AMA including but not limited to worsening of infection, deterioration of breathing, and risk of death. Despite these efforts, pt continues to refuse all interventions and demands to leave AMA.

## 2018-07-12 NOTE — PHYSICAL THERAPY INITIAL EVALUATION ADULT - LEVEL OF INDEPENDENCE: SIT/STAND, REHAB EVAL
supervision/Fairly steady with no observable LOBs; SpO2 via pulse ox taken in sitting: SpO2 room air 88%/SpO2 4LO2NC 95%. Standing SpO2 4LO2NC 94% (MD Díaz/SRINI Cunningham notified)

## 2018-07-12 NOTE — DISCHARGE NOTE ADULT - PLAN OF CARE
Continue with antibiotics and wound care. Improvement of symptoms Continue oral antibiotics and wound care and follow up with your primary care doctor as instructed. You came to the hospital with an acute exacerbation of your COPD symptoms which are clinically improved. Please continue with oxygen at home and follow up with your primary care doctor as instructed. Continue with your home medications and follow up with your primary care doctor as instructed. Continue oral antibiotics and wound care and follow up with your primary care doctor as instructed. You will be sent home on antibiotics Keflex and bactrim for 4 additional days. You came to the hospital with an acute exacerbation of your COPD symptoms which are clinically improved with steroids and antibiotics. Please continue your home medications and please follow up with your primary care doctor, Dr. Hilliard

## 2018-07-12 NOTE — DISCHARGE NOTE ADULT - CARE PLAN
Principal Discharge DX:	Cellulitis of leg, right  Goal:	Continue with antibiotics and wound care.  Secondary Diagnosis:	Chronic obstructive pulmonary disease with acute exacerbation  Secondary Diagnosis:	Chronic diastolic congestive heart failure  Secondary Diagnosis:	Hyperlipidemia, unspecified hyperlipidemia type  Secondary Diagnosis:	Essential hypertension  Secondary Diagnosis:	Type 2 diabetes mellitus without complication, without long-term current use of insulin  Secondary Diagnosis:	Atrial fibrillation, unspecified type Principal Discharge DX:	Cellulitis of leg, right  Goal:	Improvement of symptoms  Assessment and plan of treatment:	Continue oral antibiotics and wound care and follow up with your primary care doctor as instructed.  Secondary Diagnosis:	Chronic obstructive pulmonary disease with acute exacerbation  Goal:	Improvement of symptoms  Assessment and plan of treatment:	You came to the hospital with an acute exacerbation of your COPD symptoms which are clinically improved. Please continue with oxygen at home and follow up with your primary care doctor as instructed.  Secondary Diagnosis:	Chronic diastolic congestive heart failure  Goal:	Continue with your home medications and follow up with your primary care doctor as instructed.  Secondary Diagnosis:	Hyperlipidemia, unspecified hyperlipidemia type  Goal:	Continue with your home medications and follow up with your primary care doctor as instructed.  Secondary Diagnosis:	Essential hypertension  Goal:	Continue with your home medications and follow up with your primary care doctor as instructed.  Secondary Diagnosis:	Type 2 diabetes mellitus without complication, without long-term current use of insulin  Goal:	Continue with your home medications and follow up with your primary care doctor as instructed.  Secondary Diagnosis:	Atrial fibrillation, unspecified type  Goal:	Continue with your home medications and follow up with your primary care doctor as instructed. Principal Discharge DX:	Cellulitis of leg, right  Goal:	Improvement of symptoms  Assessment and plan of treatment:	Continue oral antibiotics and wound care and follow up with your primary care doctor as instructed. You will be sent home on antibiotics Keflex and bactrim for 4 additional days.  Secondary Diagnosis:	Chronic obstructive pulmonary disease with acute exacerbation  Goal:	Improvement of symptoms  Assessment and plan of treatment:	You came to the hospital with an acute exacerbation of your COPD symptoms which are clinically improved with steroids and antibiotics. Please continue your home medications and please follow up with your primary care doctor, Dr. Hilliard  Secondary Diagnosis:	Chronic diastolic congestive heart failure  Goal:	Continue with your home medications and follow up with your primary care doctor as instructed.  Secondary Diagnosis:	Hyperlipidemia, unspecified hyperlipidemia type  Goal:	Continue with your home medications and follow up with your primary care doctor as instructed.  Secondary Diagnosis:	Essential hypertension  Goal:	Continue with your home medications and follow up with your primary care doctor as instructed.  Secondary Diagnosis:	Type 2 diabetes mellitus without complication, without long-term current use of insulin  Goal:	Continue with your home medications and follow up with your primary care doctor as instructed.  Secondary Diagnosis:	Atrial fibrillation, unspecified type  Goal:	Continue with your home medications and follow up with your primary care doctor as instructed.

## 2018-07-12 NOTE — CHART NOTE - NSCHARTNOTEFT_GEN_A_CORE
Spoke again with patient, in depth discussion with patient regarding risks and benefits of leaving hospital. Patient agreed to stay in hospital overnight to receive IV antibiotics and will leave tomorrow. Also agreed to utilize bipap overnight. Patient aware and understands risks and benefits.

## 2018-07-12 NOTE — PHYSICAL THERAPY INITIAL EVALUATION ADULT - GENERAL OBSERVATIONS, REHAB EVAL
Chart reviewed. IE Completed. MD Díaz cleared for treatment however strictly bed to chair. MD Díaz confirmed AFib. Pt c/o 10/10 B/L lower leg pain at rest, yet agreeable to PT. Pt received sitting EOB, NAD, +heplock, +[id number]. Chart reviewed. IE Completed. MD Díaz cleared for ambulation to bedside chair only. MD Díaz confirmed AFib. Pt c/o 10/10 B/L lower leg pain at rest, yet agreeable to PT. Pt received sitting EOB, NAD, +heplock, +[id number]. Chart reviewed. IE Completed. MD Díaz cleared for ambulation to bedside chair only. MD Garcia confirmed AFib as observed via pulse-ox patient's HR fluctuating between 66bpm to 140s bpm not in consecutive pattern (OOB to chair only). Pt c/o 10/10 B/L lower leg pain at rest, yet agreeable to PT. Pt received sitting EOB, NAD, +heplock, +4LO2NC.

## 2018-07-12 NOTE — PHYSICAL THERAPY INITIAL EVALUATION ADULT - PLANNED THERAPY INTERVENTIONS, PT EVAL
balance training/strengthening/transfer training/bed mobility training/gait training/postural re-education

## 2018-07-12 NOTE — DISCHARGE NOTE ADULT - HOSPITAL COURSE
59yo M current smoker (50 pack years) PMH COPD (not on home O2), HTN, HLD, Afib (on Xarelto), pEF CHF  presented with b/l LE pain R>L. Has had chronic erythema of b/l LE since 2016 however came to hospital due to worsening lower extremity pain over past 1.5 weeks. Pt states that 10 days ago, he noticed vesicles on his LLE and proceeded to pop it, with worsening redness of his LLE. Then noticed RLE vesicles which he also opened with progressing ulceration of his RLE and also associated pain. States that his pain is 10/10 burning pain. Patient also separately reported increased clear sputum production and shortness of breath from his baseline over the past few days. Patient was admitted to [facility] for lower extremity cellulitis and COPD exacerbation. On regional medical floors, patient was treated for cellulitis with IV antibiotics and wound care. CBC was monitored for leukocytosis and was within normal limits. He was treated for COPD exacerbation with azithromycin, supplemental O2, nebulizers, and prednisone. He was put on bipap at night. Due to frequent refusal of O2 therapy, he was placed on enhanced observation. Vital signs were monitored, CBC was monitored, ABG was monitored, O2 sat was monitored closely and maintained @90-95%. PT was consulted and patient refused. Patient currently clinically improved, hemodynamically stable, O2 sat stable 95% on 4L O2, stable for discharge home on PO antibiotics for cellulitis and home O2 NC. 59yo M current smoker (50 pack years) PMH COPD (not on home O2), HTN, HLD, Afib (on Xarelto), pEF CHF  presented with b/l LE pain R>L. Has had chronic erythema of b/l LE since 2016 however came to hospital due to worsening lower extremity pain over past 1.5 weeks. Pt states that 10 days ago, he noticed vesicles on his LLE and proceeded to pop it, with worsening redness of his LLE. Then noticed RLE vesicles which he also opened with progressing ulceration of his RLE and also associated pain. States that his pain is 10/10 burning pain. Patient also separately reported increased clear sputum production and shortness of breath from his baseline over the past few days. Patient was admitted to [facility] for lower extremity cellulitis and COPD exacerbation. On regional medical floors, patient was treated for cellulitis with IV antibiotics and wound care. CBC was monitored for leukocytosis and was within normal limits. He was treated for COPD exacerbation with azithromycin, supplemental O2, nebulizers, and prednisone. He was put on bipap at night. Due to frequent refusal of O2 therapy, he was placed on enhanced observation. Vital signs were monitored, CBC was monitored, ABG was monitored, O2 sat was monitored closely and maintained @90-95%. PT was consulted and patient initially refused but finally evaluated and recommended home with no needs. Patient currently clinically improved, hemodynamically stable, O2 sat stable 95% on 4L O2, stable for discharge home on PO antibiotics for cellulitis and home O2 NC. 57yo M current smoker (50 pack years) PMH COPD (not on home O2), HTN, HLD, Afib (on Xarelto), pEF CHF  presented with b/l LE pain R>L. Has had chronic erythema of b/l LE since 2016 however came to hospital due to worsening lower extremity pain over past 1.5 weeks. Pt states that 10 days ago, he noticed vesicles on his LLE and proceeded to pop it, with worsening redness of his LLE. Then noticed RLE vesicles which he also opened with progressing ulceration of his RLE and also associated pain. States that his pain is 10/10 burning pain. Patient also separately reported increased clear sputum production and shortness of breath from his baseline over the past few days. Patient was admitted to [facility] for lower extremity cellulitis and COPD exacerbation. On regional medical floors, patient was treated for cellulitis with IV antibiotics and wound care. CBC was monitored for leukocytosis and was within normal limits. He was treated for COPD exacerbation with azithromycin, supplemental O2, nebulizers, and prednisone. He was put on bipap at night. Due to frequent refusal of O2 therapy, he was placed on enhanced observation. Vital signs were monitored, CBC was monitored, ABG was monitored, O2 sat was monitored closely and maintained @90-95%. PT was consulted and patient initially refused but finally evaluated and recommended home with no needs. Patient currently clinically improved, hemodynamically stable, O2 sat stable 95% on 4L O2, stable for discharge home on PO antibiotics for cellulitis. PT re-evaluated patient with- maintaining O2 sat 90% off O2 and no need for home O2. Patient determined stable with follow up with primary doctor, Dr. Hilliard.

## 2018-07-12 NOTE — DISCHARGE NOTE ADULT - OTHER SIGNIFICANT FINDINGS
< from: US Duplex Venous Lower Ext Complete, Bilateral (07.13.18 @ 12:02) >  IMPRESSION:  No deep vein thrombosis seen.    < end of copied text >  < from: US Duplex Venous Lower Ext Complete, Bilateral (07.13.18 @ 12:02) >  IMPRESSION:  No deep vein thrombosis seen.    < end of copied text >    < from: Xray Chest 1 View- PORTABLE-Urgent (07.12.18 @ 09:29) >    IMPRESSION: Hypoinflated lungs. No new infiltrates.    < end of copied text >    < from: TTE Echo w/Cont Complete (07.13.18 @ 12:48) >  There is mild concentric left ventricular hypertrophy.The left   ventricular wall   motion is normal.The left ventricular ejection fraction is borderline   normal.The left ventricular ejection fraction is estimated to be   50-55%    < end of copied text > US Duplex Venous Lower Ext Complete, Bilateral (07.13.18 @ 12:02) >  IMPRESSION:  No deep vein thrombosis seen.       Xray Chest 1 View- PORTABLE-Urgent (07.12.18 @ 09:29) >  IMPRESSION: Hypoinflated lungs. No new infiltrates.      TTE Echo w/Cont Complete (07.13.18 @ 12:48)   There is mild concentric left ventricular hypertrophy.The left   ventricular wall   motion is normal.The left ventricular ejection fraction is borderline   normal.The left ventricular ejection fraction is estimated to be   50-55%

## 2018-07-13 LAB
GLUCOSE BLDC GLUCOMTR-MCNC: 136 MG/DL — HIGH (ref 70–99)
GLUCOSE BLDC GLUCOMTR-MCNC: 139 MG/DL — HIGH (ref 70–99)
GLUCOSE BLDC GLUCOMTR-MCNC: 183 MG/DL — HIGH (ref 70–99)
GLUCOSE BLDC GLUCOMTR-MCNC: 211 MG/DL — HIGH (ref 70–99)

## 2018-07-13 PROCEDURE — 99233 SBSQ HOSP IP/OBS HIGH 50: CPT

## 2018-07-13 PROCEDURE — 93970 EXTREMITY STUDY: CPT | Mod: 26

## 2018-07-13 PROCEDURE — 93306 TTE W/DOPPLER COMPLETE: CPT | Mod: 26

## 2018-07-13 RX ORDER — ACETAMINOPHEN 500 MG
650 TABLET ORAL ONCE
Qty: 0 | Refills: 0 | Status: COMPLETED | OUTPATIENT
Start: 2018-07-13 | End: 2018-07-13

## 2018-07-13 RX ORDER — CEPHALEXIN 500 MG
500 CAPSULE ORAL
Qty: 0 | Refills: 0 | Status: DISCONTINUED | OUTPATIENT
Start: 2018-07-13 | End: 2018-07-14

## 2018-07-13 RX ADMIN — ATORVASTATIN CALCIUM 40 MILLIGRAM(S): 80 TABLET, FILM COATED ORAL at 21:53

## 2018-07-13 RX ADMIN — Medication 3 MILLILITER(S): at 12:55

## 2018-07-13 RX ADMIN — TIOTROPIUM BROMIDE 1 CAPSULE(S): 18 CAPSULE ORAL; RESPIRATORY (INHALATION) at 12:55

## 2018-07-13 RX ADMIN — Medication 2: at 21:53

## 2018-07-13 RX ADMIN — Medication 333.33 MILLIGRAM(S): at 01:08

## 2018-07-13 RX ADMIN — Medication 200 MILLIGRAM(S): at 01:45

## 2018-07-13 RX ADMIN — Medication 3 MILLILITER(S): at 18:12

## 2018-07-13 RX ADMIN — Medication 200 MILLIGRAM(S): at 02:45

## 2018-07-13 RX ADMIN — FINASTERIDE 5 MILLIGRAM(S): 5 TABLET, FILM COATED ORAL at 12:55

## 2018-07-13 RX ADMIN — Medication 500 MILLIGRAM(S): at 21:53

## 2018-07-13 RX ADMIN — Medication 3 MILLILITER(S): at 21:53

## 2018-07-13 RX ADMIN — Medication 650 MILLIGRAM(S): at 04:33

## 2018-07-13 RX ADMIN — Medication 20 MILLIGRAM(S): at 05:42

## 2018-07-13 RX ADMIN — Medication 4: at 08:49

## 2018-07-13 RX ADMIN — Medication 100 MILLIGRAM(S): at 05:42

## 2018-07-13 RX ADMIN — AZITHROMYCIN 250 MILLIGRAM(S): 500 TABLET, FILM COATED ORAL at 12:55

## 2018-07-13 RX ADMIN — Medication 333.33 MILLIGRAM(S): at 14:00

## 2018-07-13 RX ADMIN — Medication 40 MILLIGRAM(S): at 21:55

## 2018-07-13 RX ADMIN — Medication 0.12 MILLIGRAM(S): at 05:42

## 2018-07-13 RX ADMIN — RIVAROXABAN 20 MILLIGRAM(S): KIT at 18:12

## 2018-07-13 RX ADMIN — Medication 650 MILLIGRAM(S): at 05:33

## 2018-07-13 RX ADMIN — Medication 3 MILLILITER(S): at 04:34

## 2018-07-13 NOTE — PROGRESS NOTE ADULT - PROBLEM SELECTOR PLAN 9
F: none  E: replete PRN  N: DASH diet (pending A1c)    PPX: HSQ
F: none  E: replete PRN  N: DASH diet (pending A1c)    PPX: HSQ

## 2018-07-13 NOTE — PROGRESS NOTE ADULT - PROBLEM SELECTOR PROBLEM 10
Transition of care performed with sharing of clinical summary
Transition of care performed with sharing of clinical summary

## 2018-07-13 NOTE — PROVIDER CONTACT NOTE (OTHER) - ACTION/TREATMENT ORDERED:
per MD Mancia attempt to place patient ob bipap. miguel a just ate, will wait 30 minutes and attempt to place patient on bipap.

## 2018-07-13 NOTE — PROGRESS NOTE ADULT - PROBLEM SELECTOR PLAN 7
A1c 7.5, takes metformin 500 BID at home  - ISS, moderate  - consistent carb diet
A1c 7.5, takes metformin 500 BID at home  - ISS, moderate  - consistent carb diet

## 2018-07-13 NOTE — PROGRESS NOTE ADULT - PROBLEM SELECTOR PLAN 6
- BP stable  - c/w home metoprolol  - restart meds PRN    #BPH  - c/w home finasteride
- BP stable  - c/w home metoprolol  - restart meds PRN    #BPH  - c/w home finasteride

## 2018-07-13 NOTE — PROGRESS NOTE ADULT - PROBLEM SELECTOR PLAN 5
PT with h/o Afib, currently rate-controlled. States takes "fast heart rate medications"  - c/w metoprolol with holding parameters  - CHADSVASC score 3 (CHF, htn, DM), will continue AC with Xarelto  - digoxin level was sub therapeutic, restarted digoxin   - PCP prescriber (Dr. Hilliard , see contact info above)
PT with h/o Afib, currently rate-controlled. States takes "fast heart rate medications"  - c/w metoprolol with holding parameters  - CHADSVASC score 3 (CHF, htn, DM), will continue AC with Xarelto  - c/w digoxin

## 2018-07-13 NOTE — PROGRESS NOTE ADULT - PROBLEM SELECTOR PLAN 10
- will contact PCP: Rigoberto Hilliard [phone] - medically improving, dispo to home pending final echo results  - will contact PCP: Rigoberto Hilliard [phone]

## 2018-07-13 NOTE — PROGRESS NOTE ADULT - PROBLEM SELECTOR PLAN 2
Superficial Stage 2 ulcer of LLE and Stage 2-3 of RLE, nonpurulent.   - c/w vancomycin  - f/u wound consult Superficial Stage 2 ulcer of LLE and Stage 2-3 of RLE, nonpurulent.   - c/w vancomycin  - f/u wound consult  - f/u LE dopplers

## 2018-07-13 NOTE — PROGRESS NOTE ADULT - PROBLEM SELECTOR PLAN 3
Pt meeting criteria for COPD exacerbation with increased SOB, coughing and increased clear sputum production  - c/w duonebs q6h, pt not currently wheezing  - c/w tiotropium  - azithromycin 250mg  - c/w prednisone 40mg day 2 of 5 today  - cxr showed hypoinflation, no infiltrates  - abg showed chronic compensated respiratory acidosis  - patient refused echo yesterday, will try again today    #likely UTE  - bipap at night  - f/u echo today - clinical condition improving  - patient continues to refuse bipap  - c/w duonebs q6h, pt not currently wheezing  - c/w tiotropium  - azithromycin 250mg  - c/w prednisone 40mg day 2 of 5 today  - cxr showed hypoinflation, no infiltrates  - abg showed chronic compensated respiratory acidosis  - patient refused echo yesterday, will try again today    #likely UTE  - bipap at night  - f/u echo today

## 2018-07-13 NOTE — PROVIDER CONTACT NOTE (OTHER) - BACKGROUND
patient with history of afib, COPD, CHF, was previously on 4L Nc, increased to 6L NC as patient only saturating 90-93%. patient not compliant with bipap and frequently takes off NC, needs constant rem

## 2018-07-13 NOTE — PROGRESS NOTE ADULT - ATTENDING COMMENTS
Patient was seen and examined by me at bedside. I agree with resident's note, subjective, objective physical exam, assessment and plan with following modifications/additions.     1) Acute on chronic hypoxemic respiratory failure.   2) COPD exacerbation vs UTE vs OHS  3) Cellulitis left LE.  4) Chronic afib.   5) Chronic diastolic HF    Plan: Wean off NC to room air.   Est DC home on 7/14

## 2018-07-13 NOTE — PROGRESS NOTE ADULT - PROBLEM SELECTOR PLAN 1
-cellulitis of right lower extremity extending from ankle to knee, clinically improving  - c/w vancomycin for MRSA coverage in context  of patient's comorbidities  - monitor for improvement -cellulitis of right lower extremity extending from ankle to knee, clinically improving  - c/w vancomycin for MRSA coverage in context  of patient's comorbidities  - monitor for improvement  - f/u LE dopplers

## 2018-07-13 NOTE — PROGRESS NOTE ADULT - PROBLEM SELECTOR PLAN 4
Pt with h/o CHF with EF 50% back in 2016. Was given Lasix 40 IVP in ED. On physical exam, lungs clear with nonpitting LE edema. Does not appear to be in CHF exacerbation.  - started PO Lasix for b/l LE edema  - pt refused echo yesterday, will try again today today  - will contact PMD for further collateral - Rigoberto Hilliard -[phone]

## 2018-07-13 NOTE — PROGRESS NOTE ADULT - ASSESSMENT
57yo M current smoker (50 pack years) PMH COPD (not on home O2), HTN, HLD, Afib (on Xarelto), CHF (EF 50% 2/2016) admitted for with b/l LE cellulitis (R>L) that is clinically improving and COPD exacerbation.
59yo M current smoker (50 pack years) PMH COPD (not on home O2), HTN, HLD, Afib (on Xarelto), CHF (EF 50% 2/2016) admitted for with b/l LE cellulitis and COPD exacerbation.

## 2018-07-13 NOTE — PROGRESS NOTE ADULT - SUBJECTIVE AND OBJECTIVE BOX
OVERNIGHT EVENTS: Patient wore bipap until 2am and removed it    SUBJECTIVE / INTERVAL HPI: Patient seen and examined at bedside.     VITAL SIGNS:  Vital Signs Last 24 Hrs  T(C): 36.4 (13 Jul 2018 05:51), Max: 37.2 (12 Jul 2018 20:43)  T(F): 97.6 (13 Jul 2018 05:51), Max: 98.9 (12 Jul 2018 20:43)  HR: 96 (13 Jul 2018 05:51) (79 - 114)  BP: 131/71 (13 Jul 2018 05:51) (120/84 - 144/84)  BP(mean): --  RR: 16 (13 Jul 2018 05:51) (16 - 20)  SpO2: 94% (13 Jul 2018 05:51) (93% - 95%)    PHYSICAL EXAM:    General: WDWN  HEENT: NCAT; PERRL, anicteric sclera; MMM  Neck: supple, trachea midline  Cardiovascular: S1, S2 normal; RRR, no M/G/R  Respiratory: CTABL; no W/R/R  Gastrointestinal: soft, nontender, nondistended. bowel sounds present.  Skin: no ulcerations or visible rashes appreciated  Extremities: WWP; no edema, clubbing or cyanosis  Vascular: 2+ radial, DP/PT pulses B/L  Neurological: AAOx3; CN II-XII grossly intact; no focal deficits    MEDICATIONS:  MEDICATIONS  (STANDING):  ALBUTerol/ipratropium for Nebulization 3 milliLiter(s) Nebulizer every 6 hours  atorvastatin 40 milliGRAM(s) Oral at bedtime  azithromycin   Tablet 250 milliGRAM(s) Oral daily  dextrose 5%. 1000 milliLiter(s) (50 mL/Hr) IV Continuous <Continuous>  dextrose 50% Injectable 12.5 Gram(s) IV Push once  digoxin     Tablet 0.125 milliGRAM(s) Oral daily  finasteride 5 milliGRAM(s) Oral daily  furosemide    Tablet 20 milliGRAM(s) Oral daily  insulin lispro (HumaLOG) corrective regimen sliding scale   SubCutaneous Before meals and at bedtime  metoprolol succinate  milliGRAM(s) Oral daily  predniSONE   Tablet 40 milliGRAM(s) Oral daily  rivaroxaban 20 milliGRAM(s) Oral every 24 hours  tiotropium 18 MICROgram(s) Capsule 1 Capsule(s) Inhalation daily  vancomycin  IVPB 1500 milliGRAM(s) IV Intermittent every 12 hours    MEDICATIONS  (PRN):  dextrose 40% Gel 15 Gram(s) Oral once PRN Blood Glucose LESS THAN 70 milliGRAM(s)/deciliter  glucagon  Injectable 1 milliGRAM(s) IntraMuscular once PRN Glucose LESS THAN 70 milligrams/deciliter  ibuprofen  Tablet 200 milliGRAM(s) Oral every 8 hours PRN headache      ALLERGIES:  Allergies    No Known Allergies    Intolerances        LABS:                        14.8   9.9   )-----------( 244      ( 12 Jul 2018 07:10 )             48.5     07-12    142  |  99  |  18  ----------------------------<  197<H>  4.5   |  31  |  0.86    Ca    8.9      12 Jul 2018 07:10  Phos  3.6     07-12  Mg     2.1     07-12    TPro  7.3  /  Alb  4.0  /  TBili  0.4  /  DBili  x   /  AST  19  /  ALT  25  /  AlkPhos  41  07-11    PT/INR - ( 12 Jul 2018 07:10 )   PT: 12.5 sec;   INR: 1.12          PTT - ( 12 Jul 2018 07:10 )  PTT:28.8 sec    CAPILLARY BLOOD GLUCOSE      POCT Blood Glucose.: 211 mg/dL (13 Jul 2018 08:13)      RADIOLOGY & ADDITIONAL TESTS: Reviewed. OVERNIGHT EVENTS: Patient removed bipap at 2am and refused to keep it on. Patient agreed to NC at 5L and was saturating 94%. No active complaints, leg pain improving.    SUBJECTIVE / INTERVAL HPI: Patient seen and examined at bedside. No active complaints, leg pain improving.    VITAL SIGNS:  Vital Signs Last 24 Hrs  T(C): 36.4 (13 Jul 2018 05:51), Max: 37.2 (12 Jul 2018 20:43)  T(F): 97.6 (13 Jul 2018 05:51), Max: 98.9 (12 Jul 2018 20:43)  HR: 96 (13 Jul 2018 05:51) (79 - 114)  BP: 131/71 (13 Jul 2018 05:51) (120/84 - 144/84)  BP(mean): --  RR: 16 (13 Jul 2018 05:51) (16 - 20)  SpO2: 94% (13 Jul 2018 05:51) (93% - 95%)    PHYSICAL EXAM:  General: WDWN, resting comfortably  HEENT: Facial plethora, +rhinorrhea  Neck: large circumference, supple, no LAD, no JVD  Cardiovascular: S1, S2 normal; RRR, no M/G/R  Respiratory: CTABL; no W/R/R  Gastrointestinal: soft, nontender, nondistended. bowel sounds present x 4 quadrants. +umbilical nonreducible hernia  Skin:  LLE- stage 2-3 ulceration of anterior aspect of LLE, area of erythema, edema, and warmth decreased from yesterday, now extending from ankle to mid lower leg; RLE- stage 2 ulceration unchanged from yesterday, bullae on medial aspect of R lower leg, erythema, edema and warmth from ankle to mid leg; no purulent drainage b/l  Extremities: WWP; no edema, clubbing or cyanosis  Vascular: 2+ radial, DP/PT pulses B/L  Neurological: AAOx3;  no focal deficits        MEDICATIONS:  MEDICATIONS  (STANDING):  ALBUTerol/ipratropium for Nebulization 3 milliLiter(s) Nebulizer every 6 hours  atorvastatin 40 milliGRAM(s) Oral at bedtime  azithromycin   Tablet 250 milliGRAM(s) Oral daily  dextrose 5%. 1000 milliLiter(s) (50 mL/Hr) IV Continuous <Continuous>  dextrose 50% Injectable 12.5 Gram(s) IV Push once  digoxin     Tablet 0.125 milliGRAM(s) Oral daily  finasteride 5 milliGRAM(s) Oral daily  furosemide    Tablet 20 milliGRAM(s) Oral daily  insulin lispro (HumaLOG) corrective regimen sliding scale   SubCutaneous Before meals and at bedtime  metoprolol succinate  milliGRAM(s) Oral daily  predniSONE   Tablet 40 milliGRAM(s) Oral daily  rivaroxaban 20 milliGRAM(s) Oral every 24 hours  tiotropium 18 MICROgram(s) Capsule 1 Capsule(s) Inhalation daily  vancomycin  IVPB 1500 milliGRAM(s) IV Intermittent every 12 hours    MEDICATIONS  (PRN):  dextrose 40% Gel 15 Gram(s) Oral once PRN Blood Glucose LESS THAN 70 milliGRAM(s)/deciliter  glucagon  Injectable 1 milliGRAM(s) IntraMuscular once PRN Glucose LESS THAN 70 milligrams/deciliter  ibuprofen  Tablet 200 milliGRAM(s) Oral every 8 hours PRN headache      ALLERGIES:  Allergies    No Known Allergies    Intolerances        LABS:                        14.8   9.9   )-----------( 244      ( 12 Jul 2018 07:10 )             48.5     07-12    142  |  99  |  18  ----------------------------<  197<H>  4.5   |  31  |  0.86    Ca    8.9      12 Jul 2018 07:10  Phos  3.6     07-12  Mg     2.1     07-12    TPro  7.3  /  Alb  4.0  /  TBili  0.4  /  DBili  x   /  AST  19  /  ALT  25  /  AlkPhos  41  07-11    PT/INR - ( 12 Jul 2018 07:10 )   PT: 12.5 sec;   INR: 1.12          PTT - ( 12 Jul 2018 07:10 )  PTT:28.8 sec    Blood Gas Profile - Arterial (07.12.18 @ 12:43) - compensated respiratory acidosis    pH, Arterial: 7.37    pCO2, Arterial: 58 mmHg    pO2, Arterial: 94 mmHg    HCO3, Arterial: 33 mmol/L    Base Excess, Arterial: 5.9 mmol/L    Oxygen Saturation, Arterial: 97 %    Blood Gas Source Arterial: BLDA        CAPILLARY BLOOD GLUCOSE      POCT Blood Glucose.: 211 mg/dL (13 Jul 2018 08:13)      RADIOLOGY & ADDITIONAL TESTS: Reviewed.  < from: Xray Chest 1 View- PORTABLE-Urgent (07.12.18 @ 09:29) >  IMPRESSION: Hypoinflated lungs. No new infiltrates.    < end of copied text >

## 2018-07-14 VITALS — OXYGEN SATURATION: 93 % | HEART RATE: 108 BPM

## 2018-07-14 LAB
ANION GAP SERPL CALC-SCNC: 12 MMOL/L — SIGNIFICANT CHANGE UP (ref 5–17)
BUN SERPL-MCNC: 22 MG/DL — SIGNIFICANT CHANGE UP (ref 7–23)
CALCIUM SERPL-MCNC: 9.4 MG/DL — SIGNIFICANT CHANGE UP (ref 8.4–10.5)
CHLORIDE SERPL-SCNC: 96 MMOL/L — SIGNIFICANT CHANGE UP (ref 96–108)
CO2 SERPL-SCNC: 34 MMOL/L — HIGH (ref 22–31)
CREAT SERPL-MCNC: 0.86 MG/DL — SIGNIFICANT CHANGE UP (ref 0.5–1.3)
GLUCOSE BLDC GLUCOMTR-MCNC: 132 MG/DL — HIGH (ref 70–99)
GLUCOSE BLDC GLUCOMTR-MCNC: 156 MG/DL — HIGH (ref 70–99)
GLUCOSE BLDC GLUCOMTR-MCNC: 183 MG/DL — HIGH (ref 70–99)
GLUCOSE SERPL-MCNC: 216 MG/DL — HIGH (ref 70–99)
HCT VFR BLD CALC: 47 % — SIGNIFICANT CHANGE UP (ref 39–50)
HGB BLD-MCNC: 14.4 G/DL — SIGNIFICANT CHANGE UP (ref 13–17)
MAGNESIUM SERPL-MCNC: 2 MG/DL — SIGNIFICANT CHANGE UP (ref 1.6–2.6)
MCHC RBC-ENTMCNC: 28.7 PG — SIGNIFICANT CHANGE UP (ref 27–34)
MCHC RBC-ENTMCNC: 30.6 G/DL — LOW (ref 32–36)
MCV RBC AUTO: 93.6 FL — SIGNIFICANT CHANGE UP (ref 80–100)
PLATELET # BLD AUTO: 250 K/UL — SIGNIFICANT CHANGE UP (ref 150–400)
POTASSIUM SERPL-MCNC: 4.6 MMOL/L — SIGNIFICANT CHANGE UP (ref 3.5–5.3)
POTASSIUM SERPL-SCNC: 4.6 MMOL/L — SIGNIFICANT CHANGE UP (ref 3.5–5.3)
RBC # BLD: 5.02 M/UL — SIGNIFICANT CHANGE UP (ref 4.2–5.8)
RBC # FLD: 14.5 % — SIGNIFICANT CHANGE UP (ref 10.3–16.9)
SODIUM SERPL-SCNC: 142 MMOL/L — SIGNIFICANT CHANGE UP (ref 135–145)
WBC # BLD: 9.1 K/UL — SIGNIFICANT CHANGE UP (ref 3.8–10.5)
WBC # FLD AUTO: 9.1 K/UL — SIGNIFICANT CHANGE UP (ref 3.8–10.5)

## 2018-07-14 PROCEDURE — 96375 TX/PRO/DX INJ NEW DRUG ADDON: CPT

## 2018-07-14 PROCEDURE — 80162 ASSAY OF DIGOXIN TOTAL: CPT

## 2018-07-14 PROCEDURE — 80053 COMPREHEN METABOLIC PANEL: CPT

## 2018-07-14 PROCEDURE — 99285 EMERGENCY DEPT VISIT HI MDM: CPT | Mod: 25

## 2018-07-14 PROCEDURE — 94660 CPAP INITIATION&MGMT: CPT

## 2018-07-14 PROCEDURE — C8929: CPT

## 2018-07-14 PROCEDURE — 84132 ASSAY OF SERUM POTASSIUM: CPT

## 2018-07-14 PROCEDURE — 80048 BASIC METABOLIC PNL TOTAL CA: CPT

## 2018-07-14 PROCEDURE — 82330 ASSAY OF CALCIUM: CPT

## 2018-07-14 PROCEDURE — 84100 ASSAY OF PHOSPHORUS: CPT

## 2018-07-14 PROCEDURE — 85730 THROMBOPLASTIN TIME PARTIAL: CPT

## 2018-07-14 PROCEDURE — 99239 HOSP IP/OBS DSCHRG MGMT >30: CPT

## 2018-07-14 PROCEDURE — 97161 PT EVAL LOW COMPLEX 20 MIN: CPT

## 2018-07-14 PROCEDURE — 93970 EXTREMITY STUDY: CPT

## 2018-07-14 PROCEDURE — 97116 GAIT TRAINING THERAPY: CPT

## 2018-07-14 PROCEDURE — 85610 PROTHROMBIN TIME: CPT

## 2018-07-14 PROCEDURE — 96374 THER/PROPH/DIAG INJ IV PUSH: CPT

## 2018-07-14 PROCEDURE — 94640 AIRWAY INHALATION TREATMENT: CPT

## 2018-07-14 PROCEDURE — 83735 ASSAY OF MAGNESIUM: CPT

## 2018-07-14 PROCEDURE — 82962 GLUCOSE BLOOD TEST: CPT

## 2018-07-14 PROCEDURE — 36415 COLL VENOUS BLD VENIPUNCTURE: CPT

## 2018-07-14 PROCEDURE — 83036 HEMOGLOBIN GLYCOSYLATED A1C: CPT

## 2018-07-14 PROCEDURE — 83880 ASSAY OF NATRIURETIC PEPTIDE: CPT

## 2018-07-14 PROCEDURE — 85027 COMPLETE CBC AUTOMATED: CPT

## 2018-07-14 PROCEDURE — 82803 BLOOD GASES ANY COMBINATION: CPT

## 2018-07-14 PROCEDURE — 84295 ASSAY OF SERUM SODIUM: CPT

## 2018-07-14 PROCEDURE — 85025 COMPLETE CBC W/AUTO DIFF WBC: CPT

## 2018-07-14 PROCEDURE — 71045 X-RAY EXAM CHEST 1 VIEW: CPT

## 2018-07-14 PROCEDURE — 84443 ASSAY THYROID STIM HORMONE: CPT

## 2018-07-14 RX ORDER — AZITHROMYCIN 500 MG/1
1 TABLET, FILM COATED ORAL
Qty: 0 | Refills: 0 | COMMUNITY
Start: 2018-07-14

## 2018-07-14 RX ORDER — CEPHALEXIN 500 MG
1 CAPSULE ORAL
Qty: 0 | Refills: 0 | COMMUNITY
Start: 2018-07-14

## 2018-07-14 RX ORDER — DILTIAZEM HCL 120 MG
1 CAPSULE, EXT RELEASE 24 HR ORAL
Qty: 0 | Refills: 0 | COMMUNITY

## 2018-07-14 RX ORDER — CEPHALEXIN 500 MG
1 CAPSULE ORAL
Qty: 16 | Refills: 0 | OUTPATIENT
Start: 2018-07-14 | End: 2018-07-17

## 2018-07-14 RX ADMIN — Medication 500 MILLIGRAM(S): at 05:19

## 2018-07-14 RX ADMIN — Medication 100 MILLIGRAM(S): at 05:20

## 2018-07-14 RX ADMIN — Medication 1 TABLET(S): at 16:52

## 2018-07-14 RX ADMIN — Medication 500 MILLIGRAM(S): at 11:00

## 2018-07-14 RX ADMIN — Medication 3 MILLILITER(S): at 09:56

## 2018-07-14 RX ADMIN — Medication 2: at 12:33

## 2018-07-14 RX ADMIN — Medication 200 MILLIGRAM(S): at 12:27

## 2018-07-14 RX ADMIN — TIOTROPIUM BROMIDE 1 CAPSULE(S): 18 CAPSULE ORAL; RESPIRATORY (INHALATION) at 09:56

## 2018-07-14 RX ADMIN — Medication 0.12 MILLIGRAM(S): at 05:19

## 2018-07-14 RX ADMIN — Medication 20 MILLIGRAM(S): at 05:19

## 2018-07-14 RX ADMIN — RIVAROXABAN 20 MILLIGRAM(S): KIT at 16:52

## 2018-07-14 RX ADMIN — Medication 3 MILLILITER(S): at 16:19

## 2018-07-14 RX ADMIN — Medication 2: at 08:25

## 2018-07-14 RX ADMIN — Medication 200 MILLIGRAM(S): at 11:27

## 2018-07-14 RX ADMIN — AZITHROMYCIN 250 MILLIGRAM(S): 500 TABLET, FILM COATED ORAL at 11:00

## 2018-07-14 RX ADMIN — Medication 1 TABLET(S): at 05:19

## 2018-07-14 RX ADMIN — Medication 500 MILLIGRAM(S): at 16:52

## 2018-07-14 RX ADMIN — Medication 40 MILLIGRAM(S): at 16:52

## 2018-07-14 RX ADMIN — FINASTERIDE 5 MILLIGRAM(S): 5 TABLET, FILM COATED ORAL at 11:00

## 2018-07-26 DIAGNOSIS — J96.22 ACUTE AND CHRONIC RESPIRATORY FAILURE WITH HYPERCAPNIA: ICD-10-CM

## 2018-07-26 DIAGNOSIS — I50.32 CHRONIC DIASTOLIC (CONGESTIVE) HEART FAILURE: ICD-10-CM

## 2018-07-26 DIAGNOSIS — L03.116 CELLULITIS OF LEFT LOWER LIMB: ICD-10-CM

## 2018-07-26 DIAGNOSIS — L97.919 NON-PRESSURE CHRONIC ULCER OF UNSPECIFIED PART OF RIGHT LOWER LEG WITH UNSPECIFIED SEVERITY: ICD-10-CM

## 2018-07-26 DIAGNOSIS — Z53.29 PROCEDURE AND TREATMENT NOT CARRIED OUT BECAUSE OF PATIENT'S DECISION FOR OTHER REASONS: ICD-10-CM

## 2018-07-26 DIAGNOSIS — E78.5 HYPERLIPIDEMIA, UNSPECIFIED: ICD-10-CM

## 2018-07-26 DIAGNOSIS — I48.2 CHRONIC ATRIAL FIBRILLATION: ICD-10-CM

## 2018-07-26 DIAGNOSIS — I11.0 HYPERTENSIVE HEART DISEASE WITH HEART FAILURE: ICD-10-CM

## 2018-07-26 DIAGNOSIS — L03.115 CELLULITIS OF RIGHT LOWER LIMB: ICD-10-CM

## 2018-07-26 DIAGNOSIS — J44.1 CHRONIC OBSTRUCTIVE PULMONARY DISEASE WITH (ACUTE) EXACERBATION: ICD-10-CM

## 2018-07-26 DIAGNOSIS — F17.210 NICOTINE DEPENDENCE, CIGARETTES, UNCOMPLICATED: ICD-10-CM

## 2018-07-26 DIAGNOSIS — G47.33 OBSTRUCTIVE SLEEP APNEA (ADULT) (PEDIATRIC): ICD-10-CM

## 2018-07-26 DIAGNOSIS — J96.21 ACUTE AND CHRONIC RESPIRATORY FAILURE WITH HYPOXIA: ICD-10-CM

## 2018-07-26 DIAGNOSIS — Z79.84 LONG TERM (CURRENT) USE OF ORAL HYPOGLYCEMIC DRUGS: ICD-10-CM

## 2018-07-26 DIAGNOSIS — N40.0 BENIGN PROSTATIC HYPERPLASIA WITHOUT LOWER URINARY TRACT SYMPTOMS: ICD-10-CM

## 2018-07-26 DIAGNOSIS — L97.929 NON-PRESSURE CHRONIC ULCER OF UNSPECIFIED PART OF LEFT LOWER LEG WITH UNSPECIFIED SEVERITY: ICD-10-CM

## 2018-07-26 DIAGNOSIS — E66.01 MORBID (SEVERE) OBESITY DUE TO EXCESS CALORIES: ICD-10-CM

## 2018-07-26 DIAGNOSIS — E87.2 ACIDOSIS: ICD-10-CM

## 2018-07-26 DIAGNOSIS — I87.2 VENOUS INSUFFICIENCY (CHRONIC) (PERIPHERAL): ICD-10-CM

## 2018-07-26 DIAGNOSIS — E11.9 TYPE 2 DIABETES MELLITUS WITHOUT COMPLICATIONS: ICD-10-CM

## 2019-01-22 NOTE — DISCHARGE NOTE ADULT - CARE PROVIDER_API CALL
Rigoberto Hilliard  [address] [place] [address]  Phone: [phone]  Fax: [phone]
Spine appears normal, movement of extremities grossly intact.

## 2019-04-04 NOTE — PROGRESS NOTE ADULT - RS GEN HX ROS MEA POS PC
Patient Education     Dong Bonilla, 4Yr-Adult, Viral]    La diarrea suele deberse a la gastroenteritis viral, otro nombre que se le da a la âgripe estomacalâ (stomach flu). Shasta virus afecta el estÃ³reese y el tracto intestinal (intestinos) y, por lo general, dura entre 2 y 7 dÃ­as. El principal peligro que presenta la diarrea persistente es la deshidrataciÃ³n (dehydration): el cuerpo pierde mack excesiva cantidad de agua y minerales. Los antibiÃ³ticos (antibiotics) no son eficaces para esta afecciÃ³n, elieser un simple tratamiento en ely casa servirÃ¡ para aliviar los sÃ­ntomas. Cuidados En [place]:  Â· Si los sÃ­ntomas son severos, descanse en ely casa dot las primeras 24 horas o hasta que se sienta mejor. Â· Puede usar acetaminofÃ©n (acetaminophen) [Tylenol] o ibuprofeno (ibuprofen) [Motrin o Advil] para controlar la fiebre, a menos que le hayan recetado otro medicamento. [NOTA: Si tiene mack enfermedad hepÃ¡yaneli o renal crÃ³sherry (chronic liver or kidney disease), o ha tenido alguna vez mack Ãºlcera estomacal (stomach ulcer) o sangrado gastrointestinal (GI bleeding), consulte con ely mÃ©dico antes de sena estos medicamentos.] (La aspirina [aspirin] no debe usarse [employer] de 18 aÃ±os que tengan Jenkins, ya que puede causar daÃ±os graves al [place]. )  Â· No fume, no tome alcohol ni consuma productos con cafeÃ­na, dado que eso podrÃ­a empeorar los sÃ­ntomas. Â· Si le recetaron medicamentos para tratar la diarrea (diarrhea), tÃ³melos sÃ³lo segÃºn se lo indicaron. Hay ocasiones en que shasta tipo de medicamentos pueden [employer] sÃ­ntomas de la diarrea si se trata de mack diarrea infecciosa (infectious diarrhea).  Por lo tanto, no tome medicamentos para tratar la diarrea en shasta justin a menos que asÃ­ se lo indique ely mÃ©dico.  Dot [place] 12 A 24 Horas , siga la dieta que se describe a continuaciÃ³n:  Â· BEBIDAS: Bebidas deportivas cosmo Gatorade; gaseosas sin cafeÃ­na; ginger-blanca, agua mineral (zofia o saborizada), tÃ© o cafÃ© sin cafeÃ­na. Â· SOPAS: ConsomÃ© y caldos (broth, bouillon) transparentes. Â· POSTRES: Gelatina zofia (Jell-O), helados de jugo y barras de jugo de frutas. [place] [facility] [facility] , a lo anterior, puede agregarle lo siguiente:  Â· Cereal caliente, tostadas solas, pan, bolillos (pancitos), galletas. Â· Fideos solos, arroz, purÃ© de santiago, sopa de maria del rosario, de fideos o de arroz. Â· Frutas enlatadas sin endulzantes (que no sea piÃ±a), bananas (plÃ¡tanos). Â· Limite el consumo de grasas a menos de 15 gramos por dÃ­a. Para eso, evite comer margarina, [place], aceites, [place], salsas (las hechas con el jugo de cocciÃ³n de la carne [gravy] y todas las demÃ¡s), comidas fritas, mantequilla de cacahuate (manÃ­), carne, maria del rosario y pescado. Â· Limite el consumo de fibra: evite comer verduras cocidas, frutas frescas (excepto bananas [plÃ¡tanos]) y cereales de salvado. Â· Limite el consumo de cafeÃ­na y de chocolate. No use especias ni condimentos, excepto sapna.  [place] 24 Horas   Reanude poco a poco ely dieta normal, a medida que se vaya sintiendo mejor y sander sÃ­ntomas disminuyan. Programe mack VISITA DE CONTROL con ely mÃ©dico, segÃºn se le haya indicado. Llame si no mejora dentro de las prÃ³ximas 24 horas o si la diarrea dura mÃ¡s de mack semana. Si le tomaron [place] de materia fecal (diarrea [diarrhea]) para analizarla, puede llamarnos al cabo de dos dÃ­as (o segÃºn le hayan indicado) para obtener los DORROUGHBY. [place]que Prontamente AtenciÃ³n MÃ©dica  si algo de lo siguiente ocurre:  Â· Mayor dolor abdominal o dolor alba en la parte inferior derecha del abdomen. Â· VÃ³karol persistente (no puede mantener los lÃ­quidos en el estÃ³reese). Â· Diarrea frecuente (mÃ¡s de 5 veces al dÃ­a). Â· Tai en el vÃ³karol o en la materia fecal (de color negruzco o rojizo). Â· IngestiÃ³n reducida (come menos). Â· Proctor Souther, yancy cantidad [facility]. Â· Debilidad, mareo o desmayo.   Â· ConfusiÃ³n, somnolencia, rigidez en el lesa o convulsiones "(seizures). Â· Fiebre de 100.4Â°F (38Â°C) o mÃ¡s elizabeth, tomada oralmente, que no mejora con los medicamentos. Â· Gina nueva erupciÃ³n cutÃ¡david (salpullido) [rash]. Date Last Reviewed: 11/16/2015  Â© [id number] The [address] [facility]. [address] [address] Todos los derechos reservados. Esta informaciÃ³n no pretende sustituir la atenciÃ³n mÃ©dica profesional. SÃ³lo ely mÃ©dico puede diagnosticar y tratar un problema de byron. Patient Education     Dieta Blanda O Suave [[place] Diet]  Gina dieta blanda se [place] para pacientes con malestar del estÃ³reese. Consiste en alimentos que son Olivia Stiven y [address] Es mejor comer comidas frecuentes y pequeÃ±as en vez de 3 comidas grandes al dÃ­a. Bebidas   SÃ: Jugos de frutas, tÃ© y cafÃ© descafeinados, chela no carbonatados (sin gas)  MELVI: Bebidas carbonatadas (gaseosas/sodas), tÃ© y cafÃ© con cafeÃ­na, bebidas alcohÃ³licas  Pan   SÃ: Pan skinner, de isaias o de [address], galletas de soda o ""Yasir\"", tostadas, [facility], panes simples, \""bagels\""  MELVI: Pan hecho de granos enteros o no refinados  Cereal   SÃ: Cereales refinados -- cocidos o listos para comer  MELVI: Cereales de granos enteros y la \""granola\"" o la que contienen afrecho, semillas o nueces  Postres   SÃ: Chocolate, cacao, mantequilla de manÃ­ y [employer] se deben evitar  MLEVI: Kathi, nueces, semillas, jaleas y [place]   SÃ: Frutas enlatadas, cocidas, heladas o frescas sin semillas o de cascara dura  MELVI: Las [place], las cÃ¡scara y semillas de las frutas  Micki   SÃ: Toda carne fresca o preservada, pescado o ave esta hernesto  MELVI: Toda carne preparada con las especias (condimentos) de esta lista que se deben evitar.   Kristian Jointer Huevos   SÃ: Huevos y [place] requesones, queso crema y otros quesos  MELVI: Todo queso hecho con las especias que en esta lista se deben evitar  Naldo Y Pastas   SÃ: Naldo, arroz, macarrones, fideos, espaguetis  MELVI: Ninguna  Sopas   SÃ: Reggy Calender de " "sopa sin muchas especias (condimentos)  MELVI: Sopas hechas con las especias de esta lista que se deben evitar  Verduras O Vegetales   SÃ: Verdura enlatada, cocida, fresca o helada de sazÃ³n suave, sin semillas, cascaras o fibras Ã¡speras. MELVI: Verduras preparadas con las especias de esta lista que se deben ""evitar\"", las cÃ¡scaras y semillas de verduras y [address] que tienen fibra gruesa  Especies   SÃ: La sal, limÃ³n, jugo de limÃ³n, el vinagre y todo extracto, eddi, paparica, toda especia y [address] las de abajo que se deben evitar  MELVI: Kade, polvo de chili, los clavos, especias con semillas, ajo, pepinos encurtidos, salsas para ensaladas muy sazonadas  Date Last Reviewed: 11/20/2015  Â© [id number] The [address] [facility]. [address] Todos los derechos reservados. Esta informaciÃ³n no pretende sustituir la atenciÃ³n mÃ©dica profesional. SÃ³lo ely mÃ©dico puede diagnosticar y tratar un problema de byron.            " cough/dyspnea/wheezing

## 2019-06-04 NOTE — PROVIDER CONTACT NOTE (OTHER) - ASSESSMENT
patient sitting at the edge of the bed, denies any SOB at this time.  /96, HR ranging from 105-118, respirations 20, oxygen 89-93% on 6L Nc.
1-2 drinks

## 2019-10-04 NOTE — ED ADULT NURSE REASSESSMENT NOTE - NURSING ED PRESSURE ULCER LOCATION 1
[Dear  ___] : Dear  [unfilled], [Consult Letter:] : I had the pleasure of evaluating your patient, [unfilled]. [Please see my note below.] : Please see my note below. [Consult Closing:] : Thank you very much for allowing me to participate in the care of this patient.  If you have any questions, please do not hesitate to contact me. [Sincerely,] : Sincerely, [FreeTextEntry3] : Andrew Ellsi MD\par Department of Gastroenterology\par [facility]\par [address] [place] [address], [facility] [address]\[place] [address]\par Tel: [phone]\par Email: [email or web address] right lower anterior leg shin

## 2021-02-17 NOTE — PATIENT PROFILE ADULT. - BLOOD TRANSFUSION, PREVIOUS, PROFILE
You can access the FollowMyHealth Patient Portal offered by [facility] by registering at the following website: [email or web address]. By joining [employer]’s FollowMyHealth portal, you will also be able to view your health information using other applications (apps) compatible with our system. no

## 2022-03-23 NOTE — ED ADULT NURSE REASSESSMENT NOTE - NS ED NURSE REASSESS COMMENT FT1
Airway  Performed by: Yaneth Dunlap MD  Authorized by: Yaneth Dunlap MD     Final Airway Type:  Endotracheal airway  Final Endotracheal Airway*:  ETT  ETT Size (mm)*:  8.0  Cuff*:  Regular  Technique Used for Successful ETT Placement:  Video laryngoscopy  Devices/Methods Used in Placement*:  Mask  Intubation Procedure*:  ETCO2, Preoxygenation, Atraumatic, Dentition Unchanged, Pharynx Clear and Rapid Sequence Intubation  Insertion Site:  Oral  Blade Type*:  Video Laryngoscope  Cuff Volume (mL):  10  Secured at (cm)*:  24  Placement Verified by: auscultation, capnometry and palpation of cuff    Glottic View*:  1 - full view of glottis  Attempts*:  1   Patient Identified, Procedure confirmed, Emergency equipment available and Safety protocols followed  Location:  OR  Urgency:  Elective  Difficult Airway: No    Indications for Airway Management:  Anesthesia  Sedation Level:  Deep  Mask Difficulty Assessment:  0 - not attempted  Performed By:  Anesthesiologist  Anesthesiologist:  Yaneth Dunlap MD  Start Time: 3/23/2022 2:14 PM         Patient placed on BiPAP. Settings: iPAP 10 ePAP 5 O2 50 Rate 8   Patient tolertating well, O2 sat 95%.

## 2022-08-15 NOTE — ED PROVIDER NOTE - ATTENDING CONTRIBUTION TO CARE
Patient would like to establish prenatal care. LMP unknown. Had a baby Sept 2021 and hasn't had a period since. Pregnancy confirmed at an immediate care near her home. Has a referral from Dr Audra Meckel. Please call. 58 yo male h/o med noncompliance, HTN, Afib, COPD, smoker, obesity, homelessness c/o missing meds x 4 d and increased le edema w SOB.  No fever or chills, denies CP, no uri sx, no cough.  Disheveled, sleeping comfortably, nc/at, op benign, lung crackle bilat base, poor air movement, heart irreg irreg, rapid, abd soft/nt, ext 2+ bilat w erythema, warmth, mild ttp, no gross neuro deficits.  Pt w rapid afib, chf, cellulitis.  Trop neg, bnp elevated.  HR controlled w meds, given lasix w good diuresis, discussed w cardiology and declined for tele bed.  VS improved w bipap and meds.  Will admit to regional bed.
